# Patient Record
Sex: FEMALE | Race: WHITE | Employment: OTHER | URBAN - NONMETROPOLITAN AREA
[De-identification: names, ages, dates, MRNs, and addresses within clinical notes are randomized per-mention and may not be internally consistent; named-entity substitution may affect disease eponyms.]

---

## 2018-02-09 ENCOUNTER — HOSPITAL ENCOUNTER (OUTPATIENT)
Dept: NEUROLOGY | Age: 83
Discharge: HOME | End: 2018-02-09
Payer: MEDICARE

## 2018-02-09 PROCEDURE — 95910 NRV CNDJ TEST 7-8 STUDIES: CPT | Performed by: PSYCHIATRY & NEUROLOGY

## 2018-02-09 PROCEDURE — 95886 MUSC TEST DONE W/N TEST COMP: CPT

## 2018-02-09 PROCEDURE — 95910 NRV CNDJ TEST 7-8 STUDIES: CPT

## 2018-02-09 PROCEDURE — 95886 MUSC TEST DONE W/N TEST COMP: CPT | Performed by: PSYCHIATRY & NEUROLOGY

## 2018-03-28 ENCOUNTER — HOSPITAL ENCOUNTER (EMERGENCY)
Facility: HOSPITAL | Age: 83
Discharge: HOME | End: 2018-03-28
Attending: EMERGENCY MEDICINE | Admitting: EMERGENCY MEDICINE

## 2018-03-28 ENCOUNTER — APPOINTMENT (OUTPATIENT)
Dept: GENERAL RADIOLOGY | Facility: HOSPITAL | Age: 83
End: 2018-03-28

## 2018-03-28 VITALS
SYSTOLIC BLOOD PRESSURE: 118 MMHG | OXYGEN SATURATION: 96 % | HEIGHT: 67 IN | DIASTOLIC BLOOD PRESSURE: 67 MMHG | TEMPERATURE: 98.6 F | WEIGHT: 135 LBS | BODY MASS INDEX: 21.19 KG/M2 | HEART RATE: 84 BPM | RESPIRATION RATE: 18 BRPM

## 2018-03-28 DIAGNOSIS — I48.0 PAROXYSMAL ATRIAL FIBRILLATION (HCC): Primary | ICD-10-CM

## 2018-03-28 LAB
ALBUMIN SERPL-MCNC: 3.9 G/DL (ref 3.5–5)
ALBUMIN/GLOB SERPL: 1.1 G/DL (ref 1.1–2.5)
ALP SERPL-CCNC: 77 U/L (ref 24–120)
ALT SERPL W P-5'-P-CCNC: 16 U/L (ref 0–54)
ANION GAP SERPL CALCULATED.3IONS-SCNC: 14 MMOL/L (ref 4–13)
AST SERPL-CCNC: 24 U/L (ref 7–45)
BASOPHILS # BLD AUTO: 0.02 10*3/MM3 (ref 0–0.2)
BASOPHILS NFR BLD AUTO: 0.3 % (ref 0–2)
BILIRUB SERPL-MCNC: 0.6 MG/DL (ref 0.1–1)
BUN BLD-MCNC: 18 MG/DL (ref 5–21)
BUN/CREAT SERPL: 23.4 (ref 7–25)
CALCIUM SPEC-SCNC: 9.7 MG/DL (ref 8.4–10.4)
CHLORIDE SERPL-SCNC: 105 MMOL/L (ref 98–110)
CO2 SERPL-SCNC: 23 MMOL/L (ref 24–31)
CREAT BLD-MCNC: 0.77 MG/DL (ref 0.5–1.4)
D DIMER PPP FEU-MCNC: 1.63 MG/L (FEU) (ref 0–0.5)
DEPRECATED RDW RBC AUTO: 39.2 FL (ref 40–54)
EOSINOPHIL # BLD AUTO: 0.07 10*3/MM3 (ref 0–0.7)
EOSINOPHIL NFR BLD AUTO: 0.9 % (ref 0–4)
ERYTHROCYTE [DISTWIDTH] IN BLOOD BY AUTOMATED COUNT: 12.6 % (ref 12–15)
GFR SERPL CREATININE-BSD FRML MDRD: 71 ML/MIN/1.73
GLOBULIN UR ELPH-MCNC: 3.6 GM/DL
GLUCOSE BLD-MCNC: 108 MG/DL (ref 70–100)
HCT VFR BLD AUTO: 37.5 % (ref 37–47)
HGB BLD-MCNC: 12.7 G/DL (ref 12–16)
IMM GRANULOCYTES # BLD: 0.03 10*3/MM3 (ref 0–0.03)
IMM GRANULOCYTES NFR BLD: 0.4 % (ref 0–5)
LYMPHOCYTES # BLD AUTO: 2.39 10*3/MM3 (ref 0.72–4.86)
LYMPHOCYTES NFR BLD AUTO: 30.1 % (ref 15–45)
MAGNESIUM SERPL-MCNC: 2.1 MG/DL (ref 1.4–2.2)
MCH RBC QN AUTO: 29.2 PG (ref 28–32)
MCHC RBC AUTO-ENTMCNC: 33.9 G/DL (ref 33–36)
MCV RBC AUTO: 86.2 FL (ref 82–98)
MONOCYTES # BLD AUTO: 0.69 10*3/MM3 (ref 0.19–1.3)
MONOCYTES NFR BLD AUTO: 8.7 % (ref 4–12)
NEUTROPHILS # BLD AUTO: 4.74 10*3/MM3 (ref 1.87–8.4)
NEUTROPHILS NFR BLD AUTO: 59.6 % (ref 39–78)
NRBC BLD MANUAL-RTO: 0 /100 WBC (ref 0–0)
PLATELET # BLD AUTO: 251 10*3/MM3 (ref 130–400)
PMV BLD AUTO: 10.1 FL (ref 6–12)
POTASSIUM BLD-SCNC: 4.1 MMOL/L (ref 3.5–5.3)
PROT SERPL-MCNC: 7.5 G/DL (ref 6.3–8.7)
RBC # BLD AUTO: 4.35 10*6/MM3 (ref 4.2–5.4)
SODIUM BLD-SCNC: 142 MMOL/L (ref 135–145)
TROPONIN I SERPL-MCNC: 0.03 NG/ML (ref 0–0.03)
TSH SERPL DL<=0.05 MIU/L-ACNC: 1.44 MIU/ML (ref 0.47–4.68)
WBC NRBC COR # BLD: 7.94 10*3/MM3 (ref 4.8–10.8)

## 2018-03-28 PROCEDURE — 71045 X-RAY EXAM CHEST 1 VIEW: CPT

## 2018-03-28 PROCEDURE — 93010 ELECTROCARDIOGRAM REPORT: CPT | Performed by: INTERNAL MEDICINE

## 2018-03-28 PROCEDURE — 85379 FIBRIN DEGRADATION QUANT: CPT | Performed by: EMERGENCY MEDICINE

## 2018-03-28 PROCEDURE — 84484 ASSAY OF TROPONIN QUANT: CPT | Performed by: EMERGENCY MEDICINE

## 2018-03-28 PROCEDURE — 93005 ELECTROCARDIOGRAM TRACING: CPT | Performed by: EMERGENCY MEDICINE

## 2018-03-28 PROCEDURE — 99284 EMERGENCY DEPT VISIT MOD MDM: CPT

## 2018-03-28 PROCEDURE — 83735 ASSAY OF MAGNESIUM: CPT | Performed by: EMERGENCY MEDICINE

## 2018-03-28 PROCEDURE — 36415 COLL VENOUS BLD VENIPUNCTURE: CPT

## 2018-03-28 PROCEDURE — 96374 THER/PROPH/DIAG INJ IV PUSH: CPT

## 2018-03-28 PROCEDURE — 85025 COMPLETE CBC W/AUTO DIFF WBC: CPT | Performed by: EMERGENCY MEDICINE

## 2018-03-28 PROCEDURE — 84443 ASSAY THYROID STIM HORMONE: CPT | Performed by: EMERGENCY MEDICINE

## 2018-03-28 PROCEDURE — 80053 COMPREHEN METABOLIC PANEL: CPT | Performed by: EMERGENCY MEDICINE

## 2018-03-28 RX ORDER — METOPROLOL TARTRATE 5 MG/5ML
5 INJECTION INTRAVENOUS ONCE
Status: COMPLETED | OUTPATIENT
Start: 2018-03-28 | End: 2018-03-28

## 2018-03-28 RX ORDER — SODIUM CHLORIDE 0.9 % (FLUSH) 0.9 %
10 SYRINGE (ML) INJECTION AS NEEDED
Status: DISCONTINUED | OUTPATIENT
Start: 2018-03-28 | End: 2018-03-28 | Stop reason: HOSPADM

## 2018-03-28 RX ORDER — METOPROLOL SUCCINATE 25 MG/1
25 TABLET, EXTENDED RELEASE ORAL DAILY
Qty: 30 TABLET | Refills: 0 | Status: SHIPPED | OUTPATIENT
Start: 2018-03-28 | End: 2018-04-23 | Stop reason: SDUPTHER

## 2018-03-28 RX ADMIN — METOROPROLOL TARTRATE 5 MG: 5 INJECTION, SOLUTION INTRAVENOUS at 18:27

## 2018-03-31 ENCOUNTER — HOSPITAL ENCOUNTER (EMERGENCY)
Facility: HOSPITAL | Age: 83
Discharge: HOME | End: 2018-03-31
Attending: EMERGENCY MEDICINE | Admitting: EMERGENCY MEDICINE

## 2018-03-31 VITALS
HEART RATE: 48 BPM | BODY MASS INDEX: 21.19 KG/M2 | OXYGEN SATURATION: 96 % | RESPIRATION RATE: 16 BRPM | WEIGHT: 135 LBS | HEIGHT: 67 IN | DIASTOLIC BLOOD PRESSURE: 54 MMHG | TEMPERATURE: 98.2 F | SYSTOLIC BLOOD PRESSURE: 158 MMHG

## 2018-03-31 DIAGNOSIS — R07.9 CHEST PAIN, UNSPECIFIED TYPE: Primary | ICD-10-CM

## 2018-03-31 LAB
ALBUMIN SERPL-MCNC: 3.5 G/DL (ref 3.5–5)
ALBUMIN/GLOB SERPL: 1 G/DL (ref 1.1–2.5)
ALP SERPL-CCNC: 65 U/L (ref 24–120)
ALT SERPL W P-5'-P-CCNC: 29 U/L (ref 0–54)
ANION GAP SERPL CALCULATED.3IONS-SCNC: 10 MMOL/L (ref 4–13)
AST SERPL-CCNC: 23 U/L (ref 7–45)
BASOPHILS # BLD AUTO: 0.05 10*3/MM3 (ref 0–0.2)
BASOPHILS NFR BLD AUTO: 0.6 % (ref 0–2)
BILIRUB SERPL-MCNC: 0.3 MG/DL (ref 0.1–1)
BUN BLD-MCNC: 17 MG/DL (ref 5–21)
BUN/CREAT SERPL: 25.4 (ref 7–25)
CALCIUM SPEC-SCNC: 9.1 MG/DL (ref 8.4–10.4)
CHLORIDE SERPL-SCNC: 107 MMOL/L (ref 98–110)
CO2 SERPL-SCNC: 23 MMOL/L (ref 24–31)
CREAT BLD-MCNC: 0.67 MG/DL (ref 0.5–1.4)
DEPRECATED RDW RBC AUTO: 38.6 FL (ref 40–54)
EOSINOPHIL # BLD AUTO: 0.1 10*3/MM3 (ref 0–0.7)
EOSINOPHIL NFR BLD AUTO: 1.1 % (ref 0–4)
ERYTHROCYTE [DISTWIDTH] IN BLOOD BY AUTOMATED COUNT: 12.7 % (ref 12–15)
GFR SERPL CREATININE-BSD FRML MDRD: 83 ML/MIN/1.73
GLOBULIN UR ELPH-MCNC: 3.4 GM/DL
GLUCOSE BLD-MCNC: 104 MG/DL (ref 70–100)
HCT VFR BLD AUTO: 32.6 % (ref 37–47)
HGB BLD-MCNC: 11.1 G/DL (ref 12–16)
HOLD SPECIMEN: NORMAL
HOLD SPECIMEN: NORMAL
IMM GRANULOCYTES # BLD: 0.04 10*3/MM3 (ref 0–0.03)
IMM GRANULOCYTES NFR BLD: 0.5 % (ref 0–5)
LYMPHOCYTES # BLD AUTO: 1.68 10*3/MM3 (ref 0.72–4.86)
LYMPHOCYTES NFR BLD AUTO: 19 % (ref 15–45)
MCH RBC QN AUTO: 28.9 PG (ref 28–32)
MCHC RBC AUTO-ENTMCNC: 34 G/DL (ref 33–36)
MCV RBC AUTO: 84.9 FL (ref 82–98)
MONOCYTES # BLD AUTO: 0.67 10*3/MM3 (ref 0.19–1.3)
MONOCYTES NFR BLD AUTO: 7.6 % (ref 4–12)
NEUTROPHILS # BLD AUTO: 6.28 10*3/MM3 (ref 1.87–8.4)
NEUTROPHILS NFR BLD AUTO: 71.2 % (ref 39–78)
NRBC BLD MANUAL-RTO: 0 /100 WBC (ref 0–0)
PLATELET # BLD AUTO: 248 10*3/MM3 (ref 130–400)
PMV BLD AUTO: 10.1 FL (ref 6–12)
POTASSIUM BLD-SCNC: 4.2 MMOL/L (ref 3.5–5.3)
PROT SERPL-MCNC: 6.9 G/DL (ref 6.3–8.7)
RBC # BLD AUTO: 3.84 10*6/MM3 (ref 4.2–5.4)
SODIUM BLD-SCNC: 140 MMOL/L (ref 135–145)
TROPONIN I SERPL-MCNC: <0.012 NG/ML (ref 0–0.03)
TSH SERPL DL<=0.05 MIU/L-ACNC: 0.89 MIU/ML (ref 0.47–4.68)
WBC NRBC COR # BLD: 8.82 10*3/MM3 (ref 4.8–10.8)
WHOLE BLOOD HOLD SPECIMEN: NORMAL
WHOLE BLOOD HOLD SPECIMEN: NORMAL

## 2018-03-31 PROCEDURE — 85025 COMPLETE CBC W/AUTO DIFF WBC: CPT | Performed by: EMERGENCY MEDICINE

## 2018-03-31 PROCEDURE — 84484 ASSAY OF TROPONIN QUANT: CPT | Performed by: EMERGENCY MEDICINE

## 2018-03-31 PROCEDURE — 84443 ASSAY THYROID STIM HORMONE: CPT | Performed by: EMERGENCY MEDICINE

## 2018-03-31 PROCEDURE — 93010 ELECTROCARDIOGRAM REPORT: CPT | Performed by: INTERNAL MEDICINE

## 2018-03-31 PROCEDURE — 80053 COMPREHEN METABOLIC PANEL: CPT | Performed by: EMERGENCY MEDICINE

## 2018-03-31 PROCEDURE — 93005 ELECTROCARDIOGRAM TRACING: CPT | Performed by: EMERGENCY MEDICINE

## 2018-03-31 PROCEDURE — 93005 ELECTROCARDIOGRAM TRACING: CPT

## 2018-03-31 PROCEDURE — 99284 EMERGENCY DEPT VISIT MOD MDM: CPT

## 2018-03-31 RX ORDER — SODIUM CHLORIDE 0.9 % (FLUSH) 0.9 %
10 SYRINGE (ML) INJECTION AS NEEDED
Status: DISCONTINUED | OUTPATIENT
Start: 2018-03-31 | End: 2018-03-31 | Stop reason: HOSPADM

## 2018-04-04 ENCOUNTER — TELEPHONE (OUTPATIENT)
Dept: CARDIOLOGY | Facility: CLINIC | Age: 83
End: 2018-04-04

## 2018-04-04 ENCOUNTER — APPOINTMENT (OUTPATIENT)
Dept: CT IMAGING | Facility: HOSPITAL | Age: 83
End: 2018-04-04

## 2018-04-04 ENCOUNTER — HOSPITAL ENCOUNTER (EMERGENCY)
Facility: HOSPITAL | Age: 83
Discharge: HOME | End: 2018-04-04
Attending: EMERGENCY MEDICINE | Admitting: EMERGENCY MEDICINE

## 2018-04-04 VITALS
HEIGHT: 66 IN | RESPIRATION RATE: 16 BRPM | OXYGEN SATURATION: 100 % | HEART RATE: 52 BPM | BODY MASS INDEX: 21.69 KG/M2 | TEMPERATURE: 99.1 F | SYSTOLIC BLOOD PRESSURE: 189 MMHG | WEIGHT: 135 LBS | DIASTOLIC BLOOD PRESSURE: 60 MMHG

## 2018-04-04 DIAGNOSIS — I15.9 SECONDARY HYPERTENSION: Primary | ICD-10-CM

## 2018-04-04 LAB
ANION GAP SERPL CALCULATED.3IONS-SCNC: 10 MMOL/L (ref 4–13)
BASOPHILS # BLD AUTO: 0.05 10*3/MM3 (ref 0–0.2)
BASOPHILS NFR BLD AUTO: 0.7 % (ref 0–2)
BUN BLD-MCNC: 19 MG/DL (ref 5–21)
BUN/CREAT SERPL: 23.5 (ref 7–25)
CALCIUM SPEC-SCNC: 9.7 MG/DL (ref 8.4–10.4)
CHLORIDE SERPL-SCNC: 103 MMOL/L (ref 98–110)
CO2 SERPL-SCNC: 27 MMOL/L (ref 24–31)
CREAT BLD-MCNC: 0.81 MG/DL (ref 0.5–1.4)
DEPRECATED RDW RBC AUTO: 39.6 FL (ref 40–54)
EOSINOPHIL # BLD AUTO: 0.13 10*3/MM3 (ref 0–0.7)
EOSINOPHIL NFR BLD AUTO: 1.7 % (ref 0–4)
ERYTHROCYTE [DISTWIDTH] IN BLOOD BY AUTOMATED COUNT: 12.7 % (ref 12–15)
GFR SERPL CREATININE-BSD FRML MDRD: 67 ML/MIN/1.73
GLUCOSE BLD-MCNC: 103 MG/DL (ref 70–100)
HCT VFR BLD AUTO: 36.2 % (ref 37–47)
HGB BLD-MCNC: 12.2 G/DL (ref 12–16)
HOLD SPECIMEN: NORMAL
HOLD SPECIMEN: NORMAL
IMM GRANULOCYTES # BLD: 0.02 10*3/MM3 (ref 0–0.03)
IMM GRANULOCYTES NFR BLD: 0.3 % (ref 0–5)
LYMPHOCYTES # BLD AUTO: 2.53 10*3/MM3 (ref 0.72–4.86)
LYMPHOCYTES NFR BLD AUTO: 33 % (ref 15–45)
MCH RBC QN AUTO: 28.9 PG (ref 28–32)
MCHC RBC AUTO-ENTMCNC: 33.7 G/DL (ref 33–36)
MCV RBC AUTO: 85.8 FL (ref 82–98)
MONOCYTES # BLD AUTO: 0.68 10*3/MM3 (ref 0.19–1.3)
MONOCYTES NFR BLD AUTO: 8.9 % (ref 4–12)
NEUTROPHILS # BLD AUTO: 4.25 10*3/MM3 (ref 1.87–8.4)
NEUTROPHILS NFR BLD AUTO: 55.4 % (ref 39–78)
NRBC BLD MANUAL-RTO: 0 /100 WBC (ref 0–0)
PLATELET # BLD AUTO: 279 10*3/MM3 (ref 130–400)
PMV BLD AUTO: 9.7 FL (ref 6–12)
POTASSIUM BLD-SCNC: 4.4 MMOL/L (ref 3.5–5.3)
RBC # BLD AUTO: 4.22 10*6/MM3 (ref 4.2–5.4)
SODIUM BLD-SCNC: 140 MMOL/L (ref 135–145)
TROPONIN I SERPL-MCNC: <0.012 NG/ML (ref 0–0.03)
WBC NRBC COR # BLD: 7.66 10*3/MM3 (ref 4.8–10.8)
WHOLE BLOOD HOLD SPECIMEN: NORMAL
WHOLE BLOOD HOLD SPECIMEN: NORMAL

## 2018-04-04 PROCEDURE — 84484 ASSAY OF TROPONIN QUANT: CPT | Performed by: EMERGENCY MEDICINE

## 2018-04-04 PROCEDURE — 96374 THER/PROPH/DIAG INJ IV PUSH: CPT

## 2018-04-04 PROCEDURE — 80048 BASIC METABOLIC PNL TOTAL CA: CPT | Performed by: EMERGENCY MEDICINE

## 2018-04-04 PROCEDURE — 85025 COMPLETE CBC W/AUTO DIFF WBC: CPT | Performed by: EMERGENCY MEDICINE

## 2018-04-04 PROCEDURE — 93010 ELECTROCARDIOGRAM REPORT: CPT | Performed by: INTERNAL MEDICINE

## 2018-04-04 PROCEDURE — 70450 CT HEAD/BRAIN W/O DYE: CPT

## 2018-04-04 PROCEDURE — 93005 ELECTROCARDIOGRAM TRACING: CPT | Performed by: EMERGENCY MEDICINE

## 2018-04-04 PROCEDURE — 25010000002 HYDRALAZINE PER 20 MG: Performed by: EMERGENCY MEDICINE

## 2018-04-04 PROCEDURE — 99284 EMERGENCY DEPT VISIT MOD MDM: CPT

## 2018-04-04 RX ORDER — HYDRALAZINE HYDROCHLORIDE 20 MG/ML
10 INJECTION INTRAMUSCULAR; INTRAVENOUS ONCE
Status: COMPLETED | OUTPATIENT
Start: 2018-04-04 | End: 2018-04-04

## 2018-04-04 RX ORDER — HYDROCHLOROTHIAZIDE 12.5 MG/1
12.5 TABLET ORAL DAILY
Qty: 7 TABLET | Refills: 0 | Status: SHIPPED | OUTPATIENT
Start: 2018-04-04 | End: 2018-04-23 | Stop reason: SDUPTHER

## 2018-04-04 RX ADMIN — Medication 10 MG: at 22:02

## 2018-04-04 NOTE — ED NOTES
"ED Call Back Questions    1. How are you doing since leaving the Emergency Department?    Doing very well  2. Do you have any questions about your discharge instructions? No     3. Have you filled your new prescriptions yet? N/A  a. Do you have any questions about those medications? N/A    4. Were you able to make a follow-up appointment with the physician? Yes     5. Do you have a primary care physician? Yes   a. If No, would you like for me to set you up with one? N/A  i. If Yes, “I will have our ED  give you a call right back at this number to work with you on the best time for an appointment.”    6. We are always looking to get better at what we do. Do you have any suggestions for what we can do to be even better? No   a. If Yes, \"Thank you for sharing your concerns. I apologize. I will follow up with our manager and patient . Would you like someone to call you back?\" No     7. Is there anything else I can do for you? No   Visit was vey good     Fab Boykin  04/04/18 1141    "

## 2018-04-04 NOTE — TELEPHONE ENCOUNTER
Pt called left a  msg that she had gone to the ER with a fast HR and was told to call to make appt.  Pt has appt on 4/23 with Dr. Oro which is the earliest we can see her.  She wanted sooner.  I left a vm msg back to her and told her this was the best we can do and to go back to the ER or to her pcp if she feels she needs in sooner or she can call to make appt with a NP.  I left this on her vm.  Herberth Ortega, CMA

## 2018-04-05 NOTE — DISCHARGE INSTRUCTIONS
"    Hypertension  Hypertension, commonly called high blood pressure, is when the force of blood pumping through the arteries is too strong. The arteries are the blood vessels that carry blood from the heart throughout the body. Hypertension forces the heart to work harder to pump blood and may cause arteries to become narrow or stiff. Having untreated or uncontrolled hypertension can cause heart attacks, strokes, kidney disease, and other problems.  A blood pressure reading consists of a higher number over a lower number. Ideally, your blood pressure should be below 120/80. The first (\"top\") number is called the systolic pressure. It is a measure of the pressure in your arteries as your heart beats. The second (\"bottom\") number is called the diastolic pressure. It is a measure of the pressure in your arteries as the heart relaxes.  What are the causes?  The cause of this condition is not known.  What increases the risk?  Some risk factors for high blood pressure are under your control. Others are not.  Factors you can change   · Smoking.  · Having type 2 diabetes mellitus, high cholesterol, or both.  · Not getting enough exercise or physical activity.  · Being overweight.  · Having too much fat, sugar, calories, or salt (sodium) in your diet.  · Drinking too much alcohol.  Factors that are difficult or impossible to change   · Having chronic kidney disease.  · Having a family history of high blood pressure.  · Age. Risk increases with age.  · Race. You may be at higher risk if you are -American.  · Gender. Men are at higher risk than women before age 45. After age 65, women are at higher risk than men.  · Having obstructive sleep apnea.  · Stress.  What are the signs or symptoms?  Extremely high blood pressure (hypertensive crisis) may cause:  · Headache.  · Anxiety.  · Shortness of breath.  · Nosebleed.  · Nausea and vomiting.  · Severe chest pain.  · Jerky movements you cannot control (seizures).  How is " this diagnosed?  This condition is diagnosed by measuring your blood pressure while you are seated, with your arm resting on a surface. The cuff of the blood pressure monitor will be placed directly against the skin of your upper arm at the level of your heart. It should be measured at least twice using the same arm. Certain conditions can cause a difference in blood pressure between your right and left arms.  Certain factors can cause blood pressure readings to be lower or higher than normal (elevated) for a short period of time:  · When your blood pressure is higher when you are in a health care provider's office than when you are at home, this is called white coat hypertension. Most people with this condition do not need medicines.  · When your blood pressure is higher at home than when you are in a health care provider's office, this is called masked hypertension. Most people with this condition may need medicines to control blood pressure.  If you have a high blood pressure reading during one visit or you have normal blood pressure with other risk factors:  · You may be asked to return on a different day to have your blood pressure checked again.  · You may be asked to monitor your blood pressure at home for 1 week or longer.  If you are diagnosed with hypertension, you may have other blood or imaging tests to help your health care provider understand your overall risk for other conditions.  How is this treated?  This condition is treated by making healthy lifestyle changes, such as eating healthy foods, exercising more, and reducing your alcohol intake. Your health care provider may prescribe medicine if lifestyle changes are not enough to get your blood pressure under control, and if:  · Your systolic blood pressure is above 130.  · Your diastolic blood pressure is above 80.  Your personal target blood pressure may vary depending on your medical conditions, your age, and other factors.  Follow these  instructions at home:  Eating and drinking   · Eat a diet that is high in fiber and potassium, and low in sodium, added sugar, and fat. An example eating plan is called the DASH (Dietary Approaches to Stop Hypertension) diet. To eat this way:  ¨ Eat plenty of fresh fruits and vegetables. Try to fill half of your plate at each meal with fruits and vegetables.  ¨ Eat whole grains, such as whole wheat pasta, brown rice, or whole grain bread. Fill about one quarter of your plate with whole grains.  ¨ Eat or drink low-fat dairy products, such as skim milk or low-fat yogurt.  ¨ Avoid fatty cuts of meat, processed or cured meats, and poultry with skin. Fill about one quarter of your plate with lean proteins, such as fish, chicken without skin, beans, eggs, and tofu.  ¨ Avoid premade and processed foods. These tend to be higher in sodium, added sugar, and fat.  · Reduce your daily sodium intake. Most people with hypertension should eat less than 1,500 mg of sodium a day.  · Limit alcohol intake to no more than 1 drink a day for nonpregnant women and 2 drinks a day for men. One drink equals 12 oz of beer, 5 oz of wine, or 1½ oz of hard liquor.  Lifestyle   · Work with your health care provider to maintain a healthy body weight or to lose weight. Ask what an ideal weight is for you.  · Get at least 30 minutes of exercise that causes your heart to beat faster (aerobic exercise) most days of the week. Activities may include walking, swimming, or biking.  · Include exercise to strengthen your muscles (resistance exercise), such as pilates or lifting weights, as part of your weekly exercise routine. Try to do these types of exercises for 30 minutes at least 3 days a week.  · Do not use any products that contain nicotine or tobacco, such as cigarettes and e-cigarettes. If you need help quitting, ask your health care provider.  · Monitor your blood pressure at home as told by your health care provider.  · Keep all follow-up visits  as told by your health care provider. This is important.  Medicines   · Take over-the-counter and prescription medicines only as told by your health care provider. Follow directions carefully. Blood pressure medicines must be taken as prescribed.  · Do not skip doses of blood pressure medicine. Doing this puts you at risk for problems and can make the medicine less effective.  · Ask your health care provider about side effects or reactions to medicines that you should watch for.  Contact a health care provider if:  · You think you are having a reaction to a medicine you are taking.  · You have headaches that keep coming back (recurring).  · You feel dizzy.  · You have swelling in your ankles.  · You have trouble with your vision.  Get help right away if:  · You develop a severe headache or confusion.  · You have unusual weakness or numbness.  · You feel faint.  · You have severe pain in your chest or abdomen.  · You vomit repeatedly.  · You have trouble breathing.  Summary  · Hypertension is when the force of blood pumping through your arteries is too strong. If this condition is not controlled, it may put you at risk for serious complications.  · Your personal target blood pressure may vary depending on your medical conditions, your age, and other factors. For most people, a normal blood pressure is less than 120/80.  · Hypertension is treated with lifestyle changes, medicines, or a combination of both. Lifestyle changes include weight loss, eating a healthy, low-sodium diet, exercising more, and limiting alcohol.  This information is not intended to replace advice given to you by your health care provider. Make sure you discuss any questions you have with your health care provider.  Document Released: 12/18/2006 Document Revised: 11/15/2017 Document Reviewed: 11/15/2017  [employer] Interactive Patient Education © 2017 [employer]

## 2018-04-05 NOTE — ED PROVIDER NOTES
Subjective   Patient is a 87-year-old female who presents from urgent care with hypertension.  Patient does take metoprolol for rapid heart rate but otherwise no other medication.  She states she has a long-standing history of hypertension but has not taken any other medication.  Today, patient was very anxious and went to [facility] to have her blood pressure checked.  They noted it to be 230 systolic.  Patient was directed here.  Patient denies any signs of end organ damage.  Denies any headache, vision changes, focal numbness or weakness, confusion, slurred speech, chest pain, abdominal pain, urinary symptoms.  She otherwise feels normal.            Review of Systems   Constitutional: Negative for chills, diaphoresis, fatigue and fever.   HENT: Negative for sore throat.    Eyes: Negative for visual disturbance.   Respiratory: Negative for shortness of breath.    Cardiovascular: Negative for chest pain, palpitations and leg swelling.   Gastrointestinal: Negative for abdominal pain, constipation, diarrhea, nausea and vomiting.   Genitourinary: Negative for difficulty urinating, dysuria and hematuria.   Musculoskeletal: Negative for back pain.   Skin: Negative for rash.   Neurological: Negative for dizziness, tremors, seizures, syncope, speech difficulty, weakness, light-headedness, numbness and headaches.       Past Medical History:   Diagnosis Date   • Hypertension    • Pain of left heel        No Known Allergies    Past Surgical History:   Procedure Laterality Date   • CHOLECYSTECTOMY         History reviewed. No pertinent family history.    Social History     Social History   • Marital status:      Social History Main Topics   • Smoking status: Never Smoker   • Smokeless tobacco: Never Used   • Alcohol use No   • Drug use: No     Other Topics Concern   • Not on file       Lab Results (last 24 hours)     Procedure Component Value Units Date/Time    CBC & Differential [[id number]] Collected:  04/04/18 2007     Specimen:  Blood Updated:  04/04/18 2018    Narrative:       The following orders were created for panel order CBC & Differential.  Procedure                               Abnormality         Status                     ---------                               -----------         ------                     CBC Auto Differential[[id number]]        Abnormal            Final result                 Please view results for these tests on the individual orders.    Basic Metabolic Panel [[id number]]  (Abnormal) Collected:  04/04/18 2007    Specimen:  Blood Updated:  04/04/18 2026     Glucose 103 (H) mg/dL      BUN 19 mg/dL      Creatinine 0.81 mg/dL      Sodium 140 mmol/L      Potassium 4.4 mmol/L      Chloride 103 mmol/L      CO2 27.0 mmol/L      Calcium 9.7 mg/dL      eGFR Non African Amer 67 mL/min/1.73      BUN/Creatinine Ratio 23.5     Anion Gap 10.0 mmol/L     Narrative:       The MDRD GFR formula is only valid for adults with stable renal function between ages 18 and 70.    Troponin [[id number]]  (Normal) Collected:  04/04/18 2007    Specimen:  Blood Updated:  04/04/18 2039     Troponin I <0.012 ng/mL     CBC Auto Differential [[id number]]  (Abnormal) Collected:  04/04/18 2007    Specimen:  Blood Updated:  04/04/18 2018     WBC 7.66 10*3/mm3      RBC 4.22 10*6/mm3      Hemoglobin 12.2 g/dL      Hematocrit 36.2 (L) %      MCV 85.8 fL      MCH 28.9 pg      MCHC 33.7 g/dL      RDW 12.7 %      RDW-SD 39.6 (L) fl      MPV 9.7 fL      Platelets 279 10*3/mm3      Neutrophil % 55.4 %      Lymphocyte % 33.0 %      Monocyte % 8.9 %      Eosinophil % 1.7 %      Basophil % 0.7 %      Immature Grans % 0.3 %      Neutrophils, Absolute 4.25 10*3/mm3      Lymphocytes, Absolute 2.53 10*3/mm3      Monocytes, Absolute 0.68 10*3/mm3      Eosinophils, Absolute 0.13 10*3/mm3      Basophils, Absolute 0.05 10*3/mm3      Immature Grans, Absolute 0.02 10*3/mm3      nRBC 0.0 /100 WBC           Objective   Physical Exam   Constitutional: She is  oriented to person, place, and time. She appears well-nourished.  Non-toxic appearance. She does not have a sickly appearance. She does not appear ill. No distress.   Looks younger than stated age.  /71 heart rate 60   HENT:   Head: Atraumatic.   Mouth/Throat: Oropharynx is clear and moist.   Eyes: Conjunctivae and EOM are normal. Pupils are equal, round, and reactive to light.   Fundoscopic exam:       The right eye shows no papilledema.        The left eye shows no papilledema.   Neck: Normal range of motion. Neck supple.   Cardiovascular: Normal rate, regular rhythm, normal heart sounds and intact distal pulses.  Exam reveals no gallop and no friction rub.    No murmur heard.  Pulmonary/Chest: Effort normal and breath sounds normal. No respiratory distress. She has no wheezes. She has no rales. She exhibits no tenderness.   Abdominal: Soft. She exhibits no distension and no mass. There is no tenderness. There is no rebound, no guarding, no CVA tenderness and negative Montanez's sign.   Musculoskeletal: Normal range of motion. She exhibits no edema.   Neurological: She is alert and oriented to person, place, and time. She has normal strength. No cranial nerve deficit or sensory deficit. GCS eye subscore is 4. GCS verbal subscore is 5. GCS motor subscore is 6.   Skin: Skin is warm and dry. She is not diaphoretic.   Psychiatric: She has a normal mood and affect.   Nursing note and vitals reviewed.      ECG 12 Lead    Date/Time: 4/4/2018 9:02 PM  Performed by: MELANIA TOURE  Authorized by: MELANIA TOURE   Interpreted by physician  Comparison: not compared with previous ECG   Rhythm: sinus bradycardia  Rate: bradycardic  QRS axis: left  Conduction: conduction normal  ST Segments: ST segments normal  T depression: III  Other: no other findings  Clinical impression: non-specific ECG  Comments: Rate 47, GA interval 160, QRS 76, QTc 419               CT Head Without Contrast   Final Result   Moderate  "cerebral and cerebellar volume loss with chronic microvascular   disease but no evidence of acute intracranial process.           This report was finalized on 04/04/2018 21:10 by Dr. Barry Gaffney MD.          BP (!) 186/67   Pulse (!) 49   Temp 98 °F (36.7 °C) (Temporal Artery )   Resp 16   Ht 167.6 cm (66\")   Wt 61.2 kg (135 lb)   SpO2 100%   BMI 21.79 kg/m²     ED Course    ED Course   Comment By Time   According to chart, patient was here recently and was in atrial fibrillation.  Placed on metoprolol.  Patient did not want to stay in the hospital at that time. Gabriel Santacruz MD 04/04 2022   This is an 87-year-old female who presents with concerns for hypertension.  Asymptomatic otherwise.  Examination unremarkable.  No signs of end organ damage.  She has had some episodes of bradycardia but is on metoprolol.  ECG was sinus bradycardia.  There were identifiable P waves.  Her lab work is benign.  Creatinine normal.  Troponin negative.  Initial /71.  Repeat 1 hour later was 186/67 with no intervention.  aGbriel Santacruz MD 04/04 2106   Head CT was negative. Gabriel Santacruz MD 04/04 2123   Given long-standing history of hypertension , I would not recommend treating at this point. Current blood pressure 189 over 60s.  No signs of end organ damage.  We will discharge patient home.  I did advise PCP follow-up this week and patient expressed understanding.  I also discussed return precautions. Gabriel Santacruz MD 04/04 2127   Repeat pressure 220s upon discharge.  We will give 10 mg IV hydralazine and discharged home on hydrochlorothiazide.  I will provide one week and patient can follow-up this week. Gabriel Santacruz MD 04/04 2156   She does have slight bradycardia but her rate has been anywhere from 47 up to 60s.  She is on metoprolol.  I would not change this at this point.  She can follow-up this week.  She is otherwise asymptomatic.    Medications - No data to display     "     MDM  Number of Diagnoses or Management Options  Secondary hypertension: minor     Amount and/or Complexity of Data Reviewed  Clinical lab tests: ordered and reviewed  Tests in the radiology section of CPT®: ordered and reviewed    Risk of Complications, Morbidity, and/or Mortality  Presenting problems: minimal  Diagnostic procedures: minimal  Management options: minimal    Patient Progress  Patient progress: stable      Final diagnoses:   Secondary hypertension          Gabriel Santacruz MD  04/04/18 2129       Gabriel Santacruz MD  04/04/18 2153

## 2018-04-12 ENCOUNTER — APPOINTMENT (OUTPATIENT)
Dept: GENERAL RADIOLOGY | Facility: HOSPITAL | Age: 83
End: 2018-04-12

## 2018-04-12 ENCOUNTER — HOSPITAL ENCOUNTER (EMERGENCY)
Facility: HOSPITAL | Age: 83
Discharge: HOME | End: 2018-04-12
Admitting: EMERGENCY MEDICINE

## 2018-04-12 VITALS
BODY MASS INDEX: 21.53 KG/M2 | DIASTOLIC BLOOD PRESSURE: 51 MMHG | SYSTOLIC BLOOD PRESSURE: 147 MMHG | OXYGEN SATURATION: 100 % | WEIGHT: 134 LBS | HEIGHT: 66 IN | HEART RATE: 76 BPM | RESPIRATION RATE: 16 BRPM | TEMPERATURE: 97.8 F

## 2018-04-12 DIAGNOSIS — I10 ESSENTIAL HYPERTENSION: Primary | ICD-10-CM

## 2018-04-12 LAB
ALBUMIN SERPL-MCNC: 3.9 G/DL (ref 3.5–5)
ALBUMIN/GLOB SERPL: 1.1 G/DL (ref 1.1–2.5)
ALP SERPL-CCNC: 68 U/L (ref 24–120)
ALT SERPL W P-5'-P-CCNC: 20 U/L (ref 0–54)
ANION GAP SERPL CALCULATED.3IONS-SCNC: 8 MMOL/L (ref 4–13)
APTT PPP: 33.4 SECONDS (ref 24.1–34.8)
AST SERPL-CCNC: 29 U/L (ref 7–45)
BASOPHILS # BLD AUTO: 0.03 10*3/MM3 (ref 0–0.2)
BASOPHILS NFR BLD AUTO: 0.5 % (ref 0–2)
BILIRUB SERPL-MCNC: 0.6 MG/DL (ref 0.1–1)
BILIRUB UR QL STRIP: NEGATIVE
BUN BLD-MCNC: 23 MG/DL (ref 5–21)
BUN/CREAT SERPL: 31.1 (ref 7–25)
CALCIUM SPEC-SCNC: 9.6 MG/DL (ref 8.4–10.4)
CHLORIDE SERPL-SCNC: 102 MMOL/L (ref 98–110)
CLARITY UR: CLEAR
CO2 SERPL-SCNC: 28 MMOL/L (ref 24–31)
COLOR UR: YELLOW
CREAT BLD-MCNC: 0.74 MG/DL (ref 0.5–1.4)
DEPRECATED RDW RBC AUTO: 38.8 FL (ref 40–54)
EOSINOPHIL # BLD AUTO: 0.18 10*3/MM3 (ref 0–0.7)
EOSINOPHIL NFR BLD AUTO: 3.2 % (ref 0–4)
ERYTHROCYTE [DISTWIDTH] IN BLOOD BY AUTOMATED COUNT: 12.5 % (ref 12–15)
GFR SERPL CREATININE-BSD FRML MDRD: 74 ML/MIN/1.73
GLOBULIN UR ELPH-MCNC: 3.7 GM/DL
GLUCOSE BLD-MCNC: 104 MG/DL (ref 70–100)
GLUCOSE UR STRIP-MCNC: NEGATIVE MG/DL
HCT VFR BLD AUTO: 33.6 % (ref 37–47)
HGB BLD-MCNC: 11.6 G/DL (ref 12–16)
HGB UR QL STRIP.AUTO: NEGATIVE
HOLD SPECIMEN: NORMAL
HOLD SPECIMEN: NORMAL
IMM GRANULOCYTES # BLD: 0.01 10*3/MM3 (ref 0–0.03)
IMM GRANULOCYTES NFR BLD: 0.2 % (ref 0–5)
INR PPP: 1.06 (ref 0.91–1.09)
KETONES UR QL STRIP: NEGATIVE
LEUKOCYTE ESTERASE UR QL STRIP.AUTO: NEGATIVE
LYMPHOCYTES # BLD AUTO: 2.13 10*3/MM3 (ref 0.72–4.86)
LYMPHOCYTES NFR BLD AUTO: 37.7 % (ref 15–45)
MCH RBC QN AUTO: 29.4 PG (ref 28–32)
MCHC RBC AUTO-ENTMCNC: 34.5 G/DL (ref 33–36)
MCV RBC AUTO: 85.3 FL (ref 82–98)
MONOCYTES # BLD AUTO: 0.48 10*3/MM3 (ref 0.19–1.3)
MONOCYTES NFR BLD AUTO: 8.5 % (ref 4–12)
NEUTROPHILS # BLD AUTO: 2.82 10*3/MM3 (ref 1.87–8.4)
NEUTROPHILS NFR BLD AUTO: 49.9 % (ref 39–78)
NITRITE UR QL STRIP: NEGATIVE
NRBC BLD MANUAL-RTO: 0 /100 WBC (ref 0–0)
PH UR STRIP.AUTO: 8.5 [PH] (ref 5–8)
PLATELET # BLD AUTO: 274 10*3/MM3 (ref 130–400)
PMV BLD AUTO: 9.5 FL (ref 6–12)
POTASSIUM BLD-SCNC: 4 MMOL/L (ref 3.5–5.3)
PROT SERPL-MCNC: 7.6 G/DL (ref 6.3–8.7)
PROT UR QL STRIP: NEGATIVE
PROTHROMBIN TIME: 14.1 SECONDS (ref 11.9–14.6)
RBC # BLD AUTO: 3.94 10*6/MM3 (ref 4.2–5.4)
SODIUM BLD-SCNC: 138 MMOL/L (ref 135–145)
SP GR UR STRIP: 1.01 (ref 1–1.03)
TROPONIN I SERPL-MCNC: 0.01 NG/ML (ref 0–0.03)
UROBILINOGEN UR QL STRIP: ABNORMAL
WBC NRBC COR # BLD: 5.65 10*3/MM3 (ref 4.8–10.8)
WHOLE BLOOD HOLD SPECIMEN: NORMAL
WHOLE BLOOD HOLD SPECIMEN: NORMAL

## 2018-04-12 PROCEDURE — 99284 EMERGENCY DEPT VISIT MOD MDM: CPT

## 2018-04-12 PROCEDURE — 81003 URINALYSIS AUTO W/O SCOPE: CPT | Performed by: NURSE PRACTITIONER

## 2018-04-12 PROCEDURE — 93005 ELECTROCARDIOGRAM TRACING: CPT | Performed by: NURSE PRACTITIONER

## 2018-04-12 PROCEDURE — 85610 PROTHROMBIN TIME: CPT | Performed by: NURSE PRACTITIONER

## 2018-04-12 PROCEDURE — 80053 COMPREHEN METABOLIC PANEL: CPT | Performed by: NURSE PRACTITIONER

## 2018-04-12 PROCEDURE — 84484 ASSAY OF TROPONIN QUANT: CPT | Performed by: NURSE PRACTITIONER

## 2018-04-12 PROCEDURE — 93010 ELECTROCARDIOGRAM REPORT: CPT | Performed by: INTERNAL MEDICINE

## 2018-04-12 PROCEDURE — 85730 THROMBOPLASTIN TIME PARTIAL: CPT | Performed by: NURSE PRACTITIONER

## 2018-04-12 PROCEDURE — 71045 X-RAY EXAM CHEST 1 VIEW: CPT

## 2018-04-12 PROCEDURE — 87040 BLOOD CULTURE FOR BACTERIA: CPT | Performed by: NURSE PRACTITIONER

## 2018-04-12 PROCEDURE — 96374 THER/PROPH/DIAG INJ IV PUSH: CPT

## 2018-04-12 PROCEDURE — 85025 COMPLETE CBC W/AUTO DIFF WBC: CPT | Performed by: NURSE PRACTITIONER

## 2018-04-12 PROCEDURE — 25010000002 HYDRALAZINE PER 20 MG: Performed by: NURSE PRACTITIONER

## 2018-04-12 RX ORDER — SODIUM CHLORIDE 0.9 % (FLUSH) 0.9 %
10 SYRINGE (ML) INJECTION AS NEEDED
Status: DISCONTINUED | OUTPATIENT
Start: 2018-04-12 | End: 2018-04-12 | Stop reason: HOSPADM

## 2018-04-12 RX ORDER — HYDRALAZINE HYDROCHLORIDE 20 MG/ML
10 INJECTION INTRAMUSCULAR; INTRAVENOUS ONCE
Status: COMPLETED | OUTPATIENT
Start: 2018-04-12 | End: 2018-04-12

## 2018-04-12 RX ORDER — LISINOPRIL 10 MG/1
10 TABLET ORAL DAILY
Qty: 30 TABLET | Refills: 0 | Status: SHIPPED | OUTPATIENT
Start: 2018-04-12 | End: 2018-04-23 | Stop reason: SDUPTHER

## 2018-04-12 RX ADMIN — Medication 10 MG: at 13:39

## 2018-04-12 NOTE — ED PROVIDER NOTES
"Subjective   Patient is an 87-year-old white female presents with complaints of \"just not feeling well and low blood pressure is high.\"  Patient states she has been having problems with her blood pressure for the last several months.  She was seen here in emergency department on April 4 and previous to that on March 31.  She had been prescribed metoprolol has been taking that as directed.  She states she does have a appointment with Dr. Oro on April 23 for her blood pressure and her history of A. fib.  Patient states that she is status post op day 2 of right carpal tunnel surgery per Dr. Berkowitz in [place], [place].  She denies chest pain or shortness of breath.            Review of Systems   Constitutional: Negative.    HENT: Negative.    Eyes: Negative.    Respiratory: Negative.    Cardiovascular: Negative.         Patient is an 87-year-old white female presents with complaints of \"just not feeling well and low blood pressure is high.\"  Patient states she has been having problems with her blood pressure for the last several months.  She was seen here in emergency department on April 4 and previous to that on March 31.  She had been prescribed metoprolol has been taking that as directed.  She states she does have a appointment with Dr. Oro on April 23 for her blood pressure and her history of A. fib.  Patient states that she is status post op day 2 of right carpal tunnel surgery per Dr. Berkowitz in [place]  She denies chest pain or shortness of breath.     Gastrointestinal: Negative.    Endocrine: Negative.    Genitourinary: Negative.    Musculoskeletal: Negative.    Skin: Negative.    Allergic/Immunologic: Negative.    Neurological: Negative.    Hematological: Negative.    Psychiatric/Behavioral: Negative.    All other systems reviewed and are negative.      Past Medical History:   Diagnosis Date   • Hypertension    • Pain of left heel        No Known Allergies    Past Surgical History:   Procedure Laterality " "Date   • CHOLECYSTECTOMY         History reviewed. No pertinent family history.    Social History     Social History   • Marital status:      Social History Main Topics   • Smoking status: Never Smoker   • Smokeless tobacco: Never Used   • Alcohol use No   • Drug use: No     Other Topics Concern   • Not on file       Prior to Admission medications    Medication Sig Start Date End Date Taking? Authorizing Provider   hydrochlorothiazide (HYDRODIURIL) 12.5 MG tablet Take 1 tablet by mouth Daily. 4/4/18   Gabriel Santacruz MD   metoprolol succinate XL (TOPROL-XL) 25 MG 24 hr tablet Take 1 tablet by mouth Daily. 3/28/18   Byron Deutsch Jr., MD       /51   Pulse 76   Temp 97.8 °F (36.6 °C) (Oral)   Resp 16   Ht 167.6 cm (66\")   Wt 60.8 kg (134 lb)   SpO2 100%   BMI 21.63 kg/m²     Objective   Physical Exam   Constitutional: She is oriented to person, place, and time. She appears well-developed and well-nourished.   HENT:   Head: Normocephalic and atraumatic.   Eyes: Conjunctivae and EOM are normal. Pupils are equal, round, and reactive to light.   Neck: Normal range of motion. Neck supple. No tracheal deviation present. No thyromegaly present.   Cardiovascular: Normal rate, regular rhythm, normal heart sounds and intact distal pulses.    Pulmonary/Chest: Effort normal and breath sounds normal. No respiratory distress. She has no wheezes. She has no rales. She exhibits no tenderness.   Abdominal: Soft. Bowel sounds are normal.   Musculoskeletal: Normal range of motion.   Right upper extremity- surgical incision intact. No signs of infection noted. periph pulses palp.    Neurological: She is alert and oriented to person, place, and time. She has normal reflexes. No cranial nerve deficit.   Skin: Skin is warm and dry.   Psychiatric: She has a normal mood and affect. Her behavior is normal. Judgment and thought content normal.   Nursing note and vitals reviewed.      Procedures         Lab Results " (last 24 hours)     Procedure Component Value Units Date/Time    CBC & Differential [[id number]] Collected:  04/12/18 1338    Specimen:  Blood Updated:  04/12/18 1350    Narrative:       The following orders were created for panel order CBC & Differential.  Procedure                               Abnormality         Status                     ---------                               -----------         ------                     CBC Auto Differential[[id number]]        Abnormal            Final result                 Please view results for these tests on the individual orders.    Comprehensive Metabolic Panel [[id number]]  (Abnormal) Collected:  04/12/18 1338    Specimen:  Blood Updated:  04/12/18 1403     Glucose 104 (H) mg/dL      BUN 23 (H) mg/dL      Creatinine 0.74 mg/dL      Sodium 138 mmol/L      Potassium 4.0 mmol/L      Chloride 102 mmol/L      CO2 28.0 mmol/L      Calcium 9.6 mg/dL      Total Protein 7.6 g/dL      Albumin 3.90 g/dL      ALT (SGPT) 20 U/L      AST (SGOT) 29 U/L      Alkaline Phosphatase 68 U/L      Total Bilirubin 0.6 mg/dL      eGFR Non African Amer 74 mL/min/1.73      Globulin 3.7 gm/dL      A/G Ratio 1.1 g/dL      BUN/Creatinine Ratio 31.1 (H)     Anion Gap 8.0 mmol/L     Narrative:       The MDRD GFR formula is only valid for adults with stable renal function between ages 18 and 70.    Protime-INR [[id number]]  (Normal) Collected:  04/12/18 1338    Specimen:  Blood Updated:  04/12/18 1358     Protime 14.1 Seconds      INR 1.06    aPTT [[id number]]  (Normal) Collected:  04/12/18 1338    Specimen:  Blood Updated:  04/12/18 1358     PTT 33.4 seconds     Troponin [[id number]]  (Normal) Collected:  04/12/18 1338    Specimen:  Blood Updated:  04/12/18 1414     Troponin I 0.015 ng/mL     Blood Culture - Blood, Blood, Venous Line [[id number]] Collected:  04/12/18 1338    Specimen:  Blood from Arm, Left Updated:  04/12/18 1349    Blood Culture - Blood, Blood, Venous Line [[id number]] Collected:   04/12/18 1338    Specimen:  Blood from Arm, Left Updated:  04/12/18 1349    CBC Auto Differential [[id number]]  (Abnormal) Collected:  04/12/18 1338    Specimen:  Blood Updated:  04/12/18 1350     WBC 5.65 10*3/mm3      RBC 3.94 (L) 10*6/mm3      Hemoglobin 11.6 (L) g/dL      Hematocrit 33.6 (L) %      MCV 85.3 fL      MCH 29.4 pg      MCHC 34.5 g/dL      RDW 12.5 %      RDW-SD 38.8 (L) fl      MPV 9.5 fL      Platelets 274 10*3/mm3      Neutrophil % 49.9 %      Lymphocyte % 37.7 %      Monocyte % 8.5 %      Eosinophil % 3.2 %      Basophil % 0.5 %      Immature Grans % 0.2 %      Neutrophils, Absolute 2.82 10*3/mm3      Lymphocytes, Absolute 2.13 10*3/mm3      Monocytes, Absolute 0.48 10*3/mm3      Eosinophils, Absolute 0.18 10*3/mm3      Basophils, Absolute 0.03 10*3/mm3      Immature Grans, Absolute 0.01 10*3/mm3      nRBC 0.0 /100 WBC     Urinalysis With / Culture If Indicated - Urine, Clean Catch [[id number]]  (Abnormal) Collected:  04/12/18 1459    Specimen:  Urine from Urine, Clean Catch Updated:  04/12/18 1509     Color, UA Yellow     Appearance, UA Clear     pH, UA 8.5 (H)     Specific Gravity, UA 1.007     Glucose, UA Negative     Ketones, UA Negative     Bilirubin, UA Negative     Blood, UA Negative     Protein, UA Negative     Leuk Esterase, UA Negative     Nitrite, UA Negative     Urobilinogen, UA 0.2 E.U./dL    Narrative:       Urine microscopic not indicated.          XR Chest 1 View   ED Interpretation   1. No acute cardiopulmonary process.           2. Moderate-sized hiatal hernia.           This report was finalized on 04/12/2018 14:20 by Dr. Maninder May MD.      Final Result   1. No acute cardiopulmonary process.           2. Moderate-sized hiatal hernia.           This report was finalized on 04/12/2018 14:20 by Dr. Maninder May MD.          ED Course  ED Course   Comment By Time   Reviewed pt and pt care plan with dr ybarra- also assessed pt and in agreement with pt care plan. Dr ybarra  advised to add zestril to medication regimen and have follow up with pcp and dr ford as sched. Will be discharged shortly in stable condition - advised to return if complications  Misty Arce, JOSÉ MIGUEL 04/12 1507          MDM  Number of Diagnoses or Management Options  Essential hypertension: minor     Amount and/or Complexity of Data Reviewed  Clinical lab tests: ordered and reviewed  Tests in the radiology section of CPT®: ordered and reviewed  Independent visualization of images, tracings, or specimens: yes    Patient Progress  Patient progress: stable      Final diagnoses:   Essential hypertension          JOSÉ MIGUEL Sierra  04/12/18 5406

## 2018-04-12 NOTE — DISCHARGE INSTRUCTIONS
Return to ER if symptoms worsen       Hypertension  Hypertension is another name for high blood pressure. High blood pressure forces your heart to work harder to pump blood. This can cause problems over time.  There are two numbers in a blood pressure reading. There is a top number (systolic) over a bottom number (diastolic). It is best to have a blood pressure below 120/80. Healthy choices can help lower your blood pressure. You may need medicine to help lower your blood pressure if:  · Your blood pressure cannot be lowered with healthy choices.  · Your blood pressure is higher than 130/80.  Follow these instructions at home:  Eating and drinking   · If directed, follow the DASH eating plan. This diet includes:  ¨ Filling half of your plate at each meal with fruits and vegetables.  ¨ Filling one quarter of your plate at each meal with whole grains. Whole grains include whole wheat pasta, brown rice, and whole grain bread.  ¨ Eating or drinking low-fat dairy products, such as skim milk or low-fat yogurt.  ¨ Filling one quarter of your plate at each meal with low-fat (lean) proteins. Low-fat proteins include fish, skinless chicken, eggs, beans, and tofu.  ¨ Avoiding fatty meat, cured and processed meat, or chicken with skin.  ¨ Avoiding premade or processed food.  · Eat less than 1,500 mg of salt (sodium) a day.  · Limit alcohol use to no more than 1 drink a day for nonpregnant women and 2 drinks a day for men. One drink equals 12 oz of beer, 5 oz of wine, or 1½ oz of hard liquor.  Lifestyle   · Work with your doctor to stay at a healthy weight or to lose weight. Ask your doctor what the best weight is for you.  · Get at least 30 minutes of exercise that causes your heart to beat faster (aerobic exercise) most days of the week. This may include walking, swimming, or biking.  · Get at least 30 minutes of exercise that strengthens your muscles (resistance exercise) at least 3 days a week. This may include lifting  weights or pilates.  · Do not use any products that contain nicotine or tobacco. This includes cigarettes and e-cigarettes. If you need help quitting, ask your doctor.  · Check your blood pressure at home as told by your doctor.  · Keep all follow-up visits as told by your doctor. This is important.  Medicines   · Take over-the-counter and prescription medicines only as told by your doctor. Follow directions carefully.  · Do not skip doses of blood pressure medicine. The medicine does not work as well if you skip doses. Skipping doses also puts you at risk for problems.  · Ask your doctor about side effects or reactions to medicines that you should watch for.  Contact a doctor if:  · You think you are having a reaction to the medicine you are taking.  · You have headaches that keep coming back (recurring).  · You feel dizzy.  · You have swelling in your ankles.  · You have trouble with your vision.  Get help right away if:  · You get a very bad headache.  · You start to feel confused.  · You feel weak or numb.  · You feel faint.  · You get very bad pain in your:  ¨ Chest.  ¨ Belly (abdomen).  · You throw up (vomit) more than once.  · You have trouble breathing.  Summary  · Hypertension is another name for high blood pressure.  · Making healthy choices can help lower blood pressure. If your blood pressure cannot be controlled with healthy choices, you may need to take medicine.  This information is not intended to replace advice given to you by your health care provider. Make sure you discuss any questions you have with your health care provider.  Document Released: 06/05/2009 Document Revised: 11/15/2017 Document Reviewed: 11/15/2017  [employer] Patient Education © 2017 [employer]

## 2018-04-17 LAB
BACTERIA SPEC AEROBE CULT: NORMAL
BACTERIA SPEC AEROBE CULT: NORMAL

## 2018-04-23 ENCOUNTER — OFFICE VISIT (OUTPATIENT)
Dept: CARDIOLOGY | Facility: CLINIC | Age: 83
End: 2018-04-23

## 2018-04-23 ENCOUNTER — HOSPITAL ENCOUNTER (OUTPATIENT)
Dept: CARDIOLOGY | Facility: HOSPITAL | Age: 83
Discharge: HOME | End: 2018-04-23
Attending: INTERNAL MEDICINE | Admitting: INTERNAL MEDICINE

## 2018-04-23 VITALS
WEIGHT: 134 LBS | OXYGEN SATURATION: 98 % | HEART RATE: 53 BPM | DIASTOLIC BLOOD PRESSURE: 64 MMHG | HEIGHT: 66 IN | SYSTOLIC BLOOD PRESSURE: 130 MMHG | BODY MASS INDEX: 21.53 KG/M2

## 2018-04-23 DIAGNOSIS — R00.2 PALPITATIONS: ICD-10-CM

## 2018-04-23 DIAGNOSIS — R00.2 PALPITATIONS: Primary | ICD-10-CM

## 2018-04-23 DIAGNOSIS — I10 ESSENTIAL HYPERTENSION: ICD-10-CM

## 2018-04-23 PROCEDURE — 93306 TTE W/DOPPLER COMPLETE: CPT | Performed by: INTERNAL MEDICINE

## 2018-04-23 PROCEDURE — 93306 TTE W/DOPPLER COMPLETE: CPT

## 2018-04-23 PROCEDURE — 99204 OFFICE O/P NEW MOD 45 MIN: CPT | Performed by: INTERNAL MEDICINE

## 2018-04-23 RX ORDER — ASPIRIN 81 MG/1
81 TABLET ORAL DAILY
COMMUNITY

## 2018-04-23 NOTE — PROGRESS NOTES
Referring Provider: JOSÉ MIGUEL Byrd    Reason for Consultation: SOB and palpitations    Chief complaint:   Chief Complaint   Patient presents with   • New Pateint     referred by [facility] ER for chest pressure, dizziness and fatigue.   • Chest Pain     pressure.  was just in the ER last week.   • Dizziness     slightly   • Fatigue     slightly   • Shortness of Breath     the first time she went to the ER her heart was beating at 150 bpm and she was very sob.       Subjective .     History of present illness:  Jeannette Diamond is a 87 y.o. yo female with history of HTN who presents today for evaluation of SOB and palpitations that occurred 2 weeks ago but resolved before she could get to the ER. She denies any similar episodes.  Chief Complaint   Patient presents with   • New Pateint     referred by [facility] ER for chest pressure, dizziness and fatigue.   • Chest Pain     pressure.  was just in the ER last week.   • Dizziness     slightly   • Fatigue     slightly   • Shortness of Breath     the first time she went to the ER her heart was beating at 150 bpm and she was very sob.   .    History  Past Medical History:   Diagnosis Date   • Hypertension    • Pain of left heel    ,   Past Surgical History:   Procedure Laterality Date   • CHOLECYSTECTOMY     ,   Family History   Problem Relation Age of Onset   • No Known Problems Mother    • Stroke Father    • Ulcers Father    • No Known Problems Sister    • No Known Problems Brother    • No Known Problems Brother    • No Known Problems Brother    • No Known Problems Brother    ,   Social History   Substance Use Topics   • Smoking status: Never Smoker   • Smokeless tobacco: Never Used   • Alcohol use No   ,     Medications  Current Outpatient Prescriptions   Medication Sig Dispense Refill   • aspirin 81 MG EC tablet Take 81 mg by mouth Daily.     • hydrochlorothiazide (HYDRODIURIL) 12.5 MG tablet Take 1 tablet by mouth Daily. 7 tablet 0   • lisinopril (PRINIVIL,ZESTRIL) 10  "MG tablet Take 1 tablet by mouth Daily. 30 tablet 0   • metoprolol succinate XL (TOPROL-XL) 25 MG 24 hr tablet Take 1 tablet by mouth Daily. 30 tablet 0     No current facility-administered medications for this visit.         Allergies:  Review of patient's allergies indicates no known allergies.    Review of Systems  Review of Systems   HENT: Negative for nosebleeds.    Cardiovascular: Negative for chest pain, claudication, dyspnea on exertion, irregular heartbeat, leg swelling, near-syncope, orthopnea, palpitations, paroxysmal nocturnal dyspnea and syncope.   Respiratory: Negative for cough, hemoptysis and shortness of breath.    Gastrointestinal: Negative for dysphagia, hematemesis and melena.   Genitourinary: Negative for hematuria.   All other systems reviewed and are negative.      Objective     Physical Exam:  /64 (BP Location: Left arm, Patient Position: Sitting, Cuff Size: Adult)   Pulse 53   Ht 167.6 cm (66\")   Wt 60.8 kg (134 lb)   SpO2 98%   BMI 21.63 kg/m²   Physical Exam   Constitutional: She is oriented to person, place, and time. She appears well-developed and well-nourished. No distress.   HENT:   Head: Normocephalic and atraumatic.   Eyes: No scleral icterus.   Neck: Normal range of motion.   Cardiovascular: Normal rate, regular rhythm and normal heart sounds.  Exam reveals no gallop and no friction rub.    No murmur heard.  Pulmonary/Chest: Effort normal and breath sounds normal. No respiratory distress. She has no wheezes. She has no rales.   Abdominal: Soft. Bowel sounds are normal. She exhibits no distension. There is no tenderness.   Musculoskeletal: She exhibits no edema.   Neurological: She is alert and oriented to person, place, and time. No cranial nerve deficit.   Skin: Skin is warm and dry. She is not diaphoretic. No erythema.   Psychiatric: She has a normal mood and affect. Her behavior is normal.       Results Review:   I reviewed the patient's new clinical " results.  Procedures    Admission on 04/12/2018, Discharged on 04/12/2018   Component Date Value Ref Range Status   • Glucose 04/12/2018 104* 70 - 100 mg/dL Final   • BUN 04/12/2018 23* 5 - 21 mg/dL Final   • Creatinine 04/12/2018 0.74  0.50 - 1.40 mg/dL Final   • Sodium 04/12/2018 138  135 - 145 mmol/L Final   • Potassium 04/12/2018 4.0  3.5 - 5.3 mmol/L Final   • Chloride 04/12/2018 102  98 - 110 mmol/L Final   • CO2 04/12/2018 28.0  24.0 - 31.0 mmol/L Final   • Calcium 04/12/2018 9.6  8.4 - 10.4 mg/dL Final   • Total Protein 04/12/2018 7.6  6.3 - 8.7 g/dL Final   • Albumin 04/12/2018 3.90  3.50 - 5.00 g/dL Final   • ALT (SGPT) 04/12/2018 20  0 - 54 U/L Final   • AST (SGOT) 04/12/2018 29  7 - 45 U/L Final   • Alkaline Phosphatase 04/12/2018 68  24 - 120 U/L Final   • Total Bilirubin 04/12/2018 0.6  0.1 - 1.0 mg/dL Final   • eGFR Non African Amer 04/12/2018 74  >60 mL/min/1.73 Final   • Globulin 04/12/2018 3.7  gm/dL Final   • A/G Ratio 04/12/2018 1.1  1.1 - 2.5 g/dL Final   • BUN/Creatinine Ratio 04/12/2018 31.1* 7.0 - 25.0 Final   • Anion Gap 04/12/2018 8.0  4.0 - 13.0 mmol/L Final   • Protime 04/12/2018 14.1  11.9 - 14.6 Seconds Final   • INR 04/12/2018 1.06  0.91 - 1.09 Final   • PTT 04/12/2018 33.4  24.1 - 34.8 seconds Final   • Color, UA 04/12/2018 Yellow  Yellow, Straw Final   • Appearance, UA 04/12/2018 Clear  Clear Final   • pH, UA 04/12/2018 8.5* 5.0 - 8.0 Final   • Specific Gravity, UA 04/12/2018 1.007  1.005 - 1.030 Final   • Glucose, UA 04/12/2018 Negative  Negative Final   • Ketones, UA 04/12/2018 Negative  Negative Final   • Bilirubin, UA 04/12/2018 Negative  Negative Final   • Blood, UA 04/12/2018 Negative  Negative Final   • Protein, UA 04/12/2018 Negative  Negative Final   • Leuk Esterase, UA 04/12/2018 Negative  Negative Final   • Nitrite, UA 04/12/2018 Negative  Negative Final   • Urobilinogen, UA 04/12/2018 0.2 E.U./dL  0.2 - 1.0 E.U./dL Final   • Troponin I 04/12/2018 0.015  0.000 - 0.034  ng/mL Final   • Blood Culture 04/17/2018 No growth at 5 days   Final   • Blood Culture 04/17/2018 No growth at 5 days   Final   • WBC 04/12/2018 5.65  4.80 - 10.80 10*3/mm3 Final   • RBC 04/12/2018 3.94* 4.20 - 5.40 10*6/mm3 Final   • Hemoglobin 04/12/2018 11.6* 12.0 - 16.0 g/dL Final   • Hematocrit 04/12/2018 33.6* 37.0 - 47.0 % Final   • MCV 04/12/2018 85.3  82.0 - 98.0 fL Final   • MCH 04/12/2018 29.4  28.0 - 32.0 pg Final   • MCHC 04/12/2018 34.5  33.0 - 36.0 g/dL Final   • RDW 04/12/2018 12.5  12.0 - 15.0 % Final   • RDW-SD 04/12/2018 38.8* 40.0 - 54.0 fl Final   • MPV 04/12/2018 9.5  6.0 - 12.0 fL Final   • Platelets 04/12/2018 274  130 - 400 10*3/mm3 Final   • Neutrophil % 04/12/2018 49.9  39.0 - 78.0 % Final   • Lymphocyte % 04/12/2018 37.7  15.0 - 45.0 % Final   • Monocyte % 04/12/2018 8.5  4.0 - 12.0 % Final   • Eosinophil % 04/12/2018 3.2  0.0 - 4.0 % Final   • Basophil % 04/12/2018 0.5  0.0 - 2.0 % Final   • Immature Grans % 04/12/2018 0.2  0.0 - 5.0 % Final   • Neutrophils, Absolute 04/12/2018 2.82  1.87 - 8.40 10*3/mm3 Final   • Lymphocytes, Absolute 04/12/2018 2.13  0.72 - 4.86 10*3/mm3 Final   • Monocytes, Absolute 04/12/2018 0.48  0.19 - 1.30 10*3/mm3 Final   • Eosinophils, Absolute 04/12/2018 0.18  0.00 - 0.70 10*3/mm3 Final   • Basophils, Absolute 04/12/2018 0.03  0.00 - 0.20 10*3/mm3 Final   • Immature Grans, Absolute 04/12/2018 0.01  0.00 - 0.03 10*3/mm3 Final   • nRBC 04/12/2018 0.0  0.0 - 0.0 /100 WBC Final   • Extra Tube 04/12/2018 hold for add-on   Final   • Extra Tube 04/12/2018 Hold for add-ons.   Final   • Extra Tube 04/12/2018 hold for add-on   Final   • Extra Tube 04/12/2018 Hold for add-ons.   Final       Assessment/Plan   Jeannette was seen today for new pateint, chest pain, dizziness, fatigue and shortness of breath.    Diagnoses and all orders for this visit:    Palpitations, will get 21 day event monitor and an echo to assess LV function    Essential hypertension, good  control

## 2018-04-24 LAB
BH CV ECHO MEAS - AI DEC SLOPE: 143 CM/SEC^2
BH CV ECHO MEAS - AI MAX PG: 76.5 MMHG
BH CV ECHO MEAS - AI MAX VEL: 433 CM/SEC
BH CV ECHO MEAS - AI P1/2T: 886.9 MSEC
BH CV ECHO MEAS - AO MAX PG (FULL): 5.2 MMHG
BH CV ECHO MEAS - AO MAX PG: 12.1 MMHG
BH CV ECHO MEAS - AO MEAN PG (FULL): 4 MMHG
BH CV ECHO MEAS - AO MEAN PG: 7 MMHG
BH CV ECHO MEAS - AO ROOT AREA (BSA CORRECTED): 2
BH CV ECHO MEAS - AO ROOT AREA: 8.6 CM^2
BH CV ECHO MEAS - AO ROOT DIAM: 3.3 CM
BH CV ECHO MEAS - AO V2 MAX: 174 CM/SEC
BH CV ECHO MEAS - AO V2 MEAN: 129 CM/SEC
BH CV ECHO MEAS - AO V2 VTI: 47.1 CM
BH CV ECHO MEAS - AVA(I,A): 1.9 CM^2
BH CV ECHO MEAS - AVA(I,D): 1.9 CM^2
BH CV ECHO MEAS - AVA(V,A): 2.1 CM^2
BH CV ECHO MEAS - AVA(V,D): 2.1 CM^2
BH CV ECHO MEAS - BSA(HAYCOCK): 1.7 M^2
BH CV ECHO MEAS - BSA: 1.7 M^2
BH CV ECHO MEAS - BZI_BMI: 21.6 KILOGRAMS/M^2
BH CV ECHO MEAS - BZI_METRIC_HEIGHT: 167.6 CM
BH CV ECHO MEAS - BZI_METRIC_WEIGHT: 60.8 KG
BH CV ECHO MEAS - CONTRAST EF 4CH: 60.8 ML/M^2
BH CV ECHO MEAS - EDV(CUBED): 107.9 ML
BH CV ECHO MEAS - EDV(MOD-SP4): 84.7 ML
BH CV ECHO MEAS - EDV(TEICH): 105.4 ML
BH CV ECHO MEAS - EF(CUBED): 74.5 %
BH CV ECHO MEAS - EF(MOD-SP4): 60.8 %
BH CV ECHO MEAS - EF(TEICH): 66.3 %
BH CV ECHO MEAS - ESV(CUBED): 27.5 ML
BH CV ECHO MEAS - ESV(MOD-SP4): 33.2 ML
BH CV ECHO MEAS - ESV(TEICH): 35.6 ML
BH CV ECHO MEAS - FS: 36.6 %
BH CV ECHO MEAS - IVS/LVPW: 0.99
BH CV ECHO MEAS - IVSD: 1 CM
BH CV ECHO MEAS - LA DIMENSION: 3.3 CM
BH CV ECHO MEAS - LA/AO: 1
BH CV ECHO MEAS - LAT PEAK E' VEL: 7.2 CM/SEC
BH CV ECHO MEAS - LV DIASTOLIC VOL/BSA (35-75): 50.2 ML/M^2
BH CV ECHO MEAS - LV MASS(C)D: 176 GRAMS
BH CV ECHO MEAS - LV MASS(C)DI: 104.3 GRAMS/M^2
BH CV ECHO MEAS - LV MAX PG: 6.9 MMHG
BH CV ECHO MEAS - LV MEAN PG: 3 MMHG
BH CV ECHO MEAS - LV SYSTOLIC VOL/BSA (12-30): 19.7 ML/M^2
BH CV ECHO MEAS - LV V1 MAX: 131 CM/SEC
BH CV ECHO MEAS - LV V1 MEAN: 74.6 CM/SEC
BH CV ECHO MEAS - LV V1 VTI: 31 CM
BH CV ECHO MEAS - LVIDD: 4.8 CM
BH CV ECHO MEAS - LVIDS: 3 CM
BH CV ECHO MEAS - LVLD AP4: 7.9 CM
BH CV ECHO MEAS - LVLS AP4: 6.4 CM
BH CV ECHO MEAS - LVOT AREA (M): 2.8 CM^2
BH CV ECHO MEAS - LVOT AREA: 2.8 CM^2
BH CV ECHO MEAS - LVOT DIAM: 1.9 CM
BH CV ECHO MEAS - LVPWD: 1 CM
BH CV ECHO MEAS - MV A MAX VEL: 94.1 CM/SEC
BH CV ECHO MEAS - MV DEC TIME: 0.32 SEC
BH CV ECHO MEAS - MV E MAX VEL: 83.4 CM/SEC
BH CV ECHO MEAS - MV E/A: 0.89
BH CV ECHO MEAS - RAP SYSTOLE: 5 MMHG
BH CV ECHO MEAS - RVSP: 34.6 MMHG
BH CV ECHO MEAS - SI(AO): 238.8 ML/M^2
BH CV ECHO MEAS - SI(CUBED): 47.6 ML/M^2
BH CV ECHO MEAS - SI(LVOT): 52.1 ML/M^2
BH CV ECHO MEAS - SI(MOD-SP4): 30.5 ML/M^2
BH CV ECHO MEAS - SI(TEICH): 41.4 ML/M^2
BH CV ECHO MEAS - SV(AO): 402.8 ML
BH CV ECHO MEAS - SV(CUBED): 80.3 ML
BH CV ECHO MEAS - SV(LVOT): 87.9 ML
BH CV ECHO MEAS - SV(MOD-SP4): 51.5 ML
BH CV ECHO MEAS - SV(TEICH): 69.9 ML
BH CV ECHO MEAS - TR MAX VEL: 272 CM/SEC
LEFT ATRIUM VOLUME INDEX: 43 ML/M2
LV EF 2D ECHO EST: 65 %
MAXIMAL PREDICTED HEART RATE: 133 BPM
STRESS TARGET HR: 113 BPM

## 2018-04-24 RX ORDER — METOPROLOL SUCCINATE 25 MG/1
25 TABLET, EXTENDED RELEASE ORAL DAILY
Qty: 30 TABLET | Refills: 11 | Status: SHIPPED | OUTPATIENT
Start: 2018-04-24 | End: 2021-09-13 | Stop reason: SDUPTHER

## 2018-04-24 RX ORDER — HYDROCHLOROTHIAZIDE 12.5 MG/1
12.5 TABLET ORAL DAILY
Qty: 30 TABLET | Refills: 11 | Status: SHIPPED | OUTPATIENT
Start: 2018-04-24 | End: 2021-09-13 | Stop reason: SINTOL

## 2018-04-24 RX ORDER — LISINOPRIL 10 MG/1
10 TABLET ORAL DAILY
Qty: 30 TABLET | Refills: 11 | Status: SHIPPED | OUTPATIENT
Start: 2018-04-24 | End: 2019-04-09 | Stop reason: SDUPTHER

## 2018-04-27 ENCOUNTER — OFFICE VISIT (OUTPATIENT)
Dept: PODIATRY | Facility: CLINIC | Age: 83
End: 2018-04-27

## 2018-04-27 VITALS
HEIGHT: 66 IN | HEART RATE: 56 BPM | BODY MASS INDEX: 21.53 KG/M2 | WEIGHT: 134 LBS | SYSTOLIC BLOOD PRESSURE: 118 MMHG | OXYGEN SATURATION: 97 % | DIASTOLIC BLOOD PRESSURE: 60 MMHG

## 2018-04-27 DIAGNOSIS — M79.671 FOOT PAIN, RIGHT: ICD-10-CM

## 2018-04-27 DIAGNOSIS — M20.42 HAMMER TOES OF BOTH FEET: ICD-10-CM

## 2018-04-27 DIAGNOSIS — L84 FOOT CALLUS: Primary | ICD-10-CM

## 2018-04-27 DIAGNOSIS — M20.41 HAMMER TOES OF BOTH FEET: ICD-10-CM

## 2018-04-27 PROCEDURE — 99203 OFFICE O/P NEW LOW 30 MIN: CPT | Performed by: PODIATRIST

## 2018-04-27 PROCEDURE — 11056 PARNG/CUTG B9 HYPRKR LES 2-4: CPT | Performed by: PODIATRIST

## 2018-04-27 NOTE — PROGRESS NOTES
"    [facility] - PODIATRY    Today's Date: 04/27/18    Patient Name: Jeannette Diamond  MRN: [id number]  CSN: [id number]  PCP: JOSÉ MIGUEL Byrd  Referring Provider: No ref. provider found    SUBJECTIVE     Chief Complaint   Patient presents with   • Right Foot - Establish Care     PT is here to establish care. PT c/o corn on 4th right toe, pain in right foot. Pain scale: 9/10 when \"flaring up.\" PCP: Niyah Dangelo last visit 01/04/2018     HPI: Jeannette Diamond, a 87 y.o.female, comes to clinic as a(n) new patient complaining of painful lesions on toes. Patient has h/o HTN. Patient is non-diabetic. Patient states that she has painful lesions to multiple toes. States that they are most painful when wearing shoes (especially heels) and walking. Denies open wounds or sores to feet. Denies injury or trauma. Notes the lesions have come and gone over the past several years. Denies drainage or redness. Admits pain at 9/10 level and described as aching and sharp. Relates previous treatment(s) including paring by podiatrist and wearing corn pad. Denies any constitutional symptoms. No other pedal complaints at this time.    Past Medical History:   Diagnosis Date   • Hypertension    • Pain of left heel      Past Surgical History:   Procedure Laterality Date   • CHOLECYSTECTOMY       Family History   Problem Relation Age of Onset   • No Known Problems Mother    • Stroke Father    • Ulcers Father    • No Known Problems Sister    • No Known Problems Brother    • No Known Problems Brother    • No Known Problems Brother    • No Known Problems Brother      Social History     Social History   • Marital status:      Spouse name: N/A   • Number of children: N/A   • Years of education: N/A     Occupational History   • Not on file.     Social History Main Topics   • Smoking status: Never Smoker   • Smokeless tobacco: Never Used   • Alcohol use No   • Drug use: No   • Sexual activity: Defer     Other Topics Concern   • Not " on file     Social History Narrative   • No narrative on file     No Known Allergies  Current Outpatient Prescriptions   Medication Sig Dispense Refill   • aspirin 81 MG EC tablet Take 81 mg by mouth Daily.     • hydrochlorothiazide (HYDRODIURIL) 12.5 MG tablet Take 1 tablet by mouth Daily. 30 tablet 11   • lisinopril (PRINIVIL,ZESTRIL) 10 MG tablet Take 1 tablet by mouth Daily. 30 tablet 11   • metoprolol succinate XL (TOPROL-XL) 25 MG 24 hr tablet Take 1 tablet by mouth Daily. 30 tablet 11     No current facility-administered medications for this visit.      Review of Systems   Constitutional: Negative for chills and fever.   HENT: Negative for congestion.    Respiratory: Negative for shortness of breath.    Cardiovascular: Negative for chest pain and leg swelling.   Gastrointestinal: Negative for constipation, diarrhea, nausea and vomiting.   Musculoskeletal:        Foot pain   Skin: Negative for wound.   Neurological: Negative for numbness.       OBJECTIVE     Vitals:    04/27/18 1419   BP: 118/60   Pulse: 56   SpO2: 97%       PHYSICAL EXAM  GEN:   A&Ox3, NAD. Pt presents to clinic ambulating without assistance and wearing Casual Shoes.      NEURO:   Protective sensation intact to 10/10 sites Right foot, 10/10 site Left foot using [place] monofilament  Light touch sensation present  No Tinel's or Villeux sign.    VASC:  Skin temperature Warm to Warm proximal to distal shane  DP pulses 2/4 Right, 2/4 Left  PT pulses 2/4 Right, 2/4 Left  CFT 3 sec shane  Pedal hair growth absent  no edema noted shane    MUSC/SKEL:  Muscle Strength Right foot Dorsiflexors 5/5, Plantarflexors 5/5, Evertors 5/5, Invertors 5/5  Muscle Strength Left foot Dorsiflexors 5/5, Plantarflexors 5/5, Evertors 5/5, Invertors 5/5  ROM of the 1st MTP is full without pain or crepitus  ROM of the MTJ is full without pain or crepitus    ROM of the STJ is full without pain or crepitus    ROM of the ankle joint is full without pain or crepitus     POP of toe lesions at right 4th toe and 2nd toe  Rectus foot type   Gait pattern: Normal  Semi-rigid contracture of digits 2-5 shane    DERM:  Pedal nails x10 are within normal limits of length and thickness  Webspaces are Clean, Dry, and Intact  Skin is normal in turgor and texture with no open wounds or sores appreciated.  Nucleated HPK noted to dorsal DIPJ of right 4th and 2nd toes.       RADIOLOGY/NUCLEAR:  Ct Head Without Contrast    Result Date: 4/4/2018  Narrative: CT BRAIN without contrast 4/4/2018 8:27 PM CDT  HISTORY: Hypertension  COMPARISON: None  DLP: 730 mGy cm. Automated exposure control was utilized to diminish patient radiation dose.  TECHNIQUE: Serial axial tomographic images of the brain were obtained without the use of intravenous contrast.  FINDINGS: The midline structures are nondisplaced. There is moderate cerebral and cerebellar volume loss, with an associated increase in the prominence of the ventricles and sulci. The basilar cisterns are normal in size and configuration. There is no evidence of intracranial hemorrhage or mass-effect. There is low attenuation in the periventricular white matter, consistent with chronic ischemic change. There are no abnormal extra-axial fluid collections. There is no evidence of tonsillar herniation.  The included orbits and their contents are unremarkable. The visualized paranasal sinuses, mastoid air cells and middle ear cavities are clear. The visualized osseous structures and overlying soft tissues of the skull and face are intact.      Impression: Moderate cerebral and cerebellar volume loss with chronic microvascular disease but no evidence of acute intracranial process.   This report was finalized on 04/04/2018 21:10 by Dr. Barry Gaffney MD.    Xr Chest 1 View    Result Date: 4/12/2018  Narrative: EXAMINATION:   XR CHEST 1 VW-  4/12/2018 2:20 PM CDT  HISTORY: Hypertension fever  Frontal upright radiograph of the chest 4/12/2018 2:07 PM CDT   COMPARISON: March 28, 2018.  FINDINGS: Lungs are clear. Moderate to large hiatal hernia is present.. The cardiomediastinal silhouette and pulmonary vascularity are within normal limits.  The osseous structures and surrounding soft tissues demonstrate no acute abnormality.      Impression: 1. No acute cardiopulmonary process.   2. Moderate-sized hiatal hernia.   This report was finalized on 04/12/2018 14:20 by Dr. Maninder May MD.    Xr Chest 1 View    Result Date: 3/28/2018  Narrative: XR CHEST 1 VW- 3/28/2018 6:13 PM CDT  HISTORY: SOB   COMPARISON: None.  FINDINGS: Emphysematous changes are seen in both lungs. There is a moderate sized hiatal hernia. The main pulmonary artery shadow is enlarged, possibly representing pulmonary arterial hypertension.  The osseous structures and surrounding soft tissues demonstrate no acute abnormality.      Impression: 1. No evidence of acute cardiopulmonary process. 2. Moderate-sized hiatal hernia. 3. Questionable pulmonary arterial hypertension.   This report was finalized on 03/28/2018 18:16 by Dr. Hugh Merritt MD.      LABORATORY/CULTURE RESULTS:      PATHOLOGY RESULTS:       ASSESSMENT/PLAN     Jeannette was seen today for establish care.    Diagnoses and all orders for this visit:    Foot callus    Foot pain, right    Hammer toes of both feet      Comprehensive lower extremity examination and evaluation was performed.  Discussed findings and treatment plan including risks, benefits, and treatment options with patient in detail. Patient agreed with treatment plan.  After verbal consent obtained, calluses x2 pared utilizing dermal curette and/or scalpel without incidence  Patient may maintain nails and calluses at home utilizing emery board or pumice stone between visits as needed   Continue use of corn pads as needed.  Treatment will remain conservative   An After Visit Summary was printed and given to the patient at discharge, including (if requested) any available  informative/educational handouts regarding diagnosis, treatment, or medications. All questions were answered to patient/family satisfaction. Should symptoms fail to improve or worsen they agree to call or return to clinic or to go to the Emergency Department. Discussed the importance of following up with any needed screening tests/labs/specialist appointments and any requested follow-up recommended by me today. Importance of maintaining follow-up discussed and patient accepts that missed appointments can delay diagnosis and potentially lead to worsening of conditions.  Return if symptoms worsen or fail to improve., or sooner if acute issues arise.        This document has been electronically signed by Jose Antunez DPM on April 27, 2018 2:50 PM

## 2018-04-27 NOTE — PATIENT INSTRUCTIONS
Hammer Toe  Hammer toe is a change in the shape (a deformity) of your second, third, or fourth toe. The deformity causes the middle joint of your toe to stay bent. This causes pain, especially when you are wearing shoes. Hammer toe starts gradually. At first, the toe can be straightened. Gradually over time, the deformity becomes stiff and permanent.  Early treatments to keep the toe straight may relieve pain. As the deformity becomes stiff and permanent, surgery may be needed to straighten the toe.  What are the causes?  Hammer toe is caused by abnormal bending of the toe joint that is closest to your foot. It happens gradually over time. This pulls on the muscles and connections (tendons) of the toe joint, making them weak and stiff. It is often related to wearing shoes that are too short or narrow and do not let your toes straighten.  What increases the risk?  You may be at greater risk for hammer toe if you:  · Are female.  · Are older.  · Wear shoes that are too small.  · Wear high-heeled shoes that pinch your toes.  · Are a ballet dancer.  · Have a second toe that is longer than your big toe (first toe).  · Injure your foot or toe.  · Have arthritis.  · Have a family history of hammer toe.  · Have a nerve or muscle disorder.  What are the signs or symptoms?  The main symptoms of this condition are pain and deformity of the toe. The pain is worse when wearing shoes, walking, or running. Other symptoms may include:  · Corns or calluses over the bent part of the toe or between the toes.  · Redness and a burning feeling on the toe.  · An open sore that forms on the top of the toe.  · Not being able to straighten the toe.  How is this diagnosed?  This condition is diagnosed based on your symptoms and a physical exam. During the exam, your health care provider will try to straighten your toe to see how stiff the deformity is. You may also have tests, such as:  · A blood test to check for rheumatoid arthritis.  · An  X-ray to show how severe the deformity is.  How is this treated?  Treatment for this condition will depend on how stiff the deformity is. Surgery is often needed. However, sometimes a hammer toe can be straightened without surgery. Treatments that do not involve surgery include:  · Taping the toe into a straightened position.  · Using pads and cushions to protect the toe (orthotics).  · Wearing shoes that provide enough room for the toes.  · Doing toe-stretching exercises at home.  · Taking an NSAID to reduce pain and swelling.  If these treatments do not help or the toe cannot be straightened, surgery is the next option. The most common surgeries used to straighten a hammer toe include:  · Arthroplasty. In this procedure, part of the joint is removed, and that allows the toe to straighten.  · Fusion. In this procedure, cartilage between the two bones of the joint is taken out and the bones are fused together into one longer bone.  · Implantation. In this procedure, part of the bone is removed and replaced with an implant to let the toe move again.  · Flexor tendon transfer. In this procedure, the tendons that curl the toes down (flexor tendons) are repositioned.  Follow these instructions at home:  · Take over-the-counter and prescription medicines only as told by your health care provider.  · Do toe straightening and stretching exercises as told by your health care provider.  · Keep all follow-up visits as told by your health care provider. This is important.  How is this prevented?  · Wear shoes that give your toes enough room and do not cause pain.  · Do not wear high-heeled shoes.  Contact a health care provider if:  · Your pain gets worse.  · Your toe becomes red or swollen.  · You develop an open sore on your toe.  This information is not intended to replace advice given to you by your health care provider. Make sure you discuss any questions you have with your health care provider.  Document Released:  12/15/2001 Document Revised: 07/07/2017 Document Reviewed: 04/12/2017  [employer] Interactive Patient Education © 2017 [employer]    Corns and Calluses  Corns are small areas of thickened skin that occur on the top, sides, or tip of a toe. They contain a cone-shaped core with a point that can press on a nerve below. This causes pain. Calluses are areas of thickened skin that can occur anywhere on the body including hands, fingers, palms, soles of the feet, and heels. Calluses are usually larger than corns.  What are the causes?  Corns and calluses are caused by rubbing (friction) or pressure, such as from shoes that are too tight or do not fit properly.  What increases the risk?  Corns are more likely to develop in people who have toe deformities, such as hammer toes.  Since calluses can occur with friction to any area of the skin, calluses are more likely to develop in people who:  · Work with their hands.  · Wear shoes that fit poorly, shoes that are too tight, or shoes that are high-heeled.  · Have toes deformities.  What are the signs or symptoms?  Symptoms of a corn or callus include:  · A hard growth on the skin.  · Pain or tenderness under the skin.  · Redness and swelling.  · Increased discomfort while wearing tight-fitting shoes.  How is this diagnosed?  Corns and calluses may be diagnosed with a medical history and physical exam.  How is this treated?  Corns and calluses may be treated with:  · Removing the cause of the friction or pressure. This may include:  ¨ Changing your shoes.  ¨ Wearing shoe inserts (orthotics) or other protective layers in your shoes, such as a corn pad.  ¨ Wearing gloves.  · Medicines to help soften skin in the hardened, thickened areas.  · Reducing the size of the corn or callus by removing the dead layers of skin.  · Antibiotic medicines to treat infection.  · Surgery, if a toe deformity is the cause.  Follow these instructions at home:  · Take medicines only as directed by  your health care provider.  · If you were prescribed an antibiotic, finish all of it even if you start to feel better.  · Wear shoes that fit well. Avoid wearing high-heeled shoes and shoes that are too tight or too loose.  · Wear any padding, protective layers, gloves, or orthotics as directed by your health care provider.  · Soak your hands or feet and then use a file or pumice stone to soften your corn or callus. Do this as directed by your health care provider.  · Check your corn or callus every day for signs of infection. Watch for:  ¨ Redness, swelling, or pain.  ¨ Fluid, blood, or pus.  Contact a health care provider if:  · Your symptoms do not improve with treatment.  · You have increased redness, swelling, or pain at the site of your corn or callus.  · You have fluid, blood, or pus coming from your corn or callus.  · You have new symptoms.  This information is not intended to replace advice given to you by your health care provider. Make sure you discuss any questions you have with your health care provider.  Document Released: 09/23/2005 Document Revised: 07/07/2017 Document Reviewed: 12/14/2015  [employer] Interactive Patient Education © 2017 [employer]

## 2018-05-04 ENCOUNTER — TELEPHONE (OUTPATIENT)
Dept: CARDIOLOGY | Facility: CLINIC | Age: 83
End: 2018-05-04

## 2018-05-04 NOTE — TELEPHONE ENCOUNTER
----- Message from Stephane Oro MD sent at 4/25/2018 10:23 AM CDT -----  EF is normal    Pt informed.  Mailed letter.  Herberth Ortega, CMA

## 2018-05-23 ENCOUNTER — TELEPHONE (OUTPATIENT)
Dept: CARDIOLOGY | Facility: CLINIC | Age: 83
End: 2018-05-23

## 2018-05-23 NOTE — TELEPHONE ENCOUNTER
----- Message from Stephane Oro MD sent at 5/17/2018 10:20 PM CDT -----  No significant arrhythmia identified      Pt informed.  Mailed letter.  Herberth Ortega, CMA

## 2018-08-27 ENCOUNTER — OFFICE VISIT (OUTPATIENT)
Dept: CARDIOLOGY | Facility: CLINIC | Age: 83
End: 2018-08-27

## 2018-08-27 VITALS
WEIGHT: 135 LBS | SYSTOLIC BLOOD PRESSURE: 148 MMHG | BODY MASS INDEX: 21.69 KG/M2 | OXYGEN SATURATION: 93 % | DIASTOLIC BLOOD PRESSURE: 60 MMHG | HEIGHT: 66 IN | HEART RATE: 71 BPM

## 2018-08-27 DIAGNOSIS — R00.2 PALPITATIONS: Primary | ICD-10-CM

## 2018-08-27 DIAGNOSIS — I10 ESSENTIAL HYPERTENSION: ICD-10-CM

## 2018-08-27 PROCEDURE — 99214 OFFICE O/P EST MOD 30 MIN: CPT | Performed by: INTERNAL MEDICINE

## 2018-08-27 PROCEDURE — 93000 ELECTROCARDIOGRAM COMPLETE: CPT | Performed by: INTERNAL MEDICINE

## 2018-08-27 NOTE — PROGRESS NOTES
Referring Provider: Niyah Dangelo APRN    Reason for Follow-up Visit: palpitations    Subjective .   Chief Complaint:   Chief Complaint   Patient presents with   • Follow-up     3 month fu   • lv dysfunction     follwing up after having a 21 day monitor.  pt having no complaints, just some weakness in the legs at times.       History of present illness:  Jeannette Diamond is a 88 y.o. yo female with history of palpitations. Her echo was relatively normal as was her 21 day event monitor. She says she is feeling better now. No chest pain.        History  Past Medical History:   Diagnosis Date   • Hypertension    • Pain of left heel    ,   Past Surgical History:   Procedure Laterality Date   • CARPAL TUNNEL RELEASE     • CHOLECYSTECTOMY     ,   Family History   Problem Relation Age of Onset   • No Known Problems Mother    • Stroke Father    • Ulcers Father    • No Known Problems Sister    • No Known Problems Brother    • No Known Problems Brother    • No Known Problems Brother    • No Known Problems Brother    ,   Social History   Substance Use Topics   • Smoking status: Former Smoker     Start date: 1948     Quit date: 1955   • Smokeless tobacco: Never Used      Comment: only smoked socially and did not inhale.   • Alcohol use No   ,     Medications  Current Outpatient Prescriptions   Medication Sig Dispense Refill   • aspirin 81 MG EC tablet Take 81 mg by mouth Daily.     • Fexofenadine-Pseudoephedrine (ALLEGRA-D 24 HOUR PO) Take 180 mg by mouth Daily.     • hydrochlorothiazide (HYDRODIURIL) 12.5 MG tablet Take 1 tablet by mouth Daily. 30 tablet 11   • lisinopril (PRINIVIL,ZESTRIL) 10 MG tablet Take 1 tablet by mouth Daily. 30 tablet 11   • metoprolol succinate XL (TOPROL-XL) 25 MG 24 hr tablet Take 1 tablet by mouth Daily. 30 tablet 11     No current facility-administered medications for this visit.        Allergies:  Patient has no known allergies.    Review of Systems  Review of Systems   HENT: Negative for  "nosebleeds.    Cardiovascular: Negative for chest pain, claudication, dyspnea on exertion, irregular heartbeat, leg swelling, near-syncope, orthopnea, palpitations, paroxysmal nocturnal dyspnea and syncope.   Respiratory: Negative for cough, hemoptysis and shortness of breath.    Gastrointestinal: Negative for dysphagia, hematemesis and melena.   Genitourinary: Negative for hematuria.   All other systems reviewed and are negative.      Objective     Physical Exam:  /60 (BP Location: Left arm, Patient Position: Sitting, Cuff Size: Adult)   Pulse 71   Ht 167.6 cm (66\")   Wt 61.2 kg (135 lb)   SpO2 93%   BMI 21.79 kg/m²   Physical Exam   Constitutional: She is oriented to person, place, and time. She appears well-developed and well-nourished. No distress.   HENT:   Head: Normocephalic and atraumatic.   Eyes: No scleral icterus.   Neck: Normal range of motion.   Cardiovascular: Normal rate, regular rhythm and normal heart sounds.  Exam reveals no gallop and no friction rub.    No murmur heard.  Pulmonary/Chest: Effort normal and breath sounds normal. No respiratory distress. She has no wheezes. She has no rales.   Abdominal: Soft. Bowel sounds are normal. She exhibits no distension. There is no tenderness.   Musculoskeletal: She exhibits no edema.   Neurological: She is alert and oriented to person, place, and time. No cranial nerve deficit.   Skin: Skin is warm and dry. She is not diaphoretic. No erythema.   Psychiatric: She has a normal mood and affect. Her behavior is normal.       Results Review:    ECG 12 Lead  Date/Time: 8/27/2018 9:21 AM  Performed by: CIERA WARNER  Authorized by: CIERA WARNER   Comparison: compared with previous ECG   Similar to previous ECG  Rhythm: sinus bradycardia  Rate: normal  Conduction: conduction normal  ST Segments: ST segments normal  T Waves: T waves normal  QRS axis: normal  Clinical impression: non-specific ECG  Comments: Poor R wave progression            Hospital " Outpatient Visit on 04/23/2018   Component Date Value Ref Range Status   • BSA 04/23/2018 1.7  m^2 Final   • IVSd 04/23/2018 1.0  cm Final   • LVIDd 04/23/2018 4.8  cm Final   • LVIDs 04/23/2018 3.0  cm Final   • LVPWd 04/23/2018 1.0  cm Final   • IVS/LVPW 04/23/2018 0.99   Final   • FS 04/23/2018 36.6  % Final   • EDV(Teich) 04/23/2018 105.4  ml Final   • ESV(Teich) 04/23/2018 35.6  ml Final   • EF(Teich) 04/23/2018 66.3  % Final   • EDV(cubed) 04/23/2018 107.9  ml Final   • ESV(cubed) 04/23/2018 27.5  ml Final   • EF(cubed) 04/23/2018 74.5  % Final   • LV mass(C)d 04/23/2018 176.0  grams Final   • LV mass(C)dI 04/23/2018 104.3  grams/m^2 Final   • SV(Teich) 04/23/2018 69.9  ml Final   • SI(Teich) 04/23/2018 41.4  ml/m^2 Final   • SV(cubed) 04/23/2018 80.3  ml Final   • SI(cubed) 04/23/2018 47.6  ml/m^2 Final   • Ao root diam 04/23/2018 3.3  cm Final   • Ao root area 04/23/2018 8.6  cm^2 Final   • LA dimension 04/23/2018 3.3  cm Final   • LA/Ao 04/23/2018 1.0   Final   • LVOT diam 04/23/2018 1.9  cm Final   • LVOT area 04/23/2018 2.8  cm^2 Final   • LVOT area(traced) 04/23/2018 2.8  cm^2 Final   • LVLd ap4 04/23/2018 7.9  cm Final   • EDV(MOD-sp4) 04/23/2018 84.7  ml Final   • LVLs ap4 04/23/2018 6.4  cm Final   • ESV(MOD-sp4) 04/23/2018 33.2  ml Final   • EF(MOD-sp4) 04/23/2018 60.8  % Final   • SV(MOD-sp4) 04/23/2018 51.5  ml Final   • SI(MOD-sp4) 04/23/2018 30.5  ml/m^2 Final   • Ao root area (BSA corrected) 04/23/2018 2.0   Final   • EF - Contrast (4Ch) 04/23/2018 60.8  ml/m^2 Final   • LV Marlow Vol (BSA corrected) 04/23/2018 50.2  ml/m^2 Final   • LV Sys Vol (BSA corrected) 04/23/2018 19.7  ml/m^2 Final   • MV E max bita 04/23/2018 83.4  cm/sec Final   • MV A max bita 04/23/2018 94.1  cm/sec Final   • MV E/A 04/23/2018 0.89   Final   • MV dec time 04/23/2018 0.32  sec Final   • Ao pk bita 04/23/2018 174.0  cm/sec Final   • Ao max PG 04/23/2018 12.1  mmHg Final   • Ao max PG (full) 04/23/2018 5.2  mmHg Final   • Ao  V2 mean 04/23/2018 129.0  cm/sec Final   • Ao mean PG 04/23/2018 7.0  mmHg Final   • Ao mean PG (full) 04/23/2018 4.0  mmHg Final   • Ao V2 VTI 04/23/2018 47.1  cm Final   • KT(I,A) 04/23/2018 1.9  cm^2 Final   • KT(I,D) 04/23/2018 1.9  cm^2 Final   • KT(V,A) 04/23/2018 2.1  cm^2 Final   • KT(V,D) 04/23/2018 2.1  cm^2 Final   • AI max oswaldo 04/23/2018 433.0  cm/sec Final   • AI max PG 04/23/2018 76.5  mmHg Final   • AI dec slope 04/23/2018 143.0  cm/sec^2 Final   • AI P1/2t 04/23/2018 886.9  msec Final   • LV V1 max PG 04/23/2018 6.9  mmHg Final   • LV V1 mean PG 04/23/2018 3.0  mmHg Final   • LV V1 max 04/23/2018 131.0  cm/sec Final   • LV V1 mean 04/23/2018 74.6  cm/sec Final   • LV V1 VTI 04/23/2018 31.0  cm Final   • SV(Ao) 04/23/2018 402.8  ml Final   • SI(Ao) 04/23/2018 238.8  ml/m^2 Final   • SV(LVOT) 04/23/2018 87.9  ml Final   • SI(LVOT) 04/23/2018 52.1  ml/m^2 Final   • TR max oswaldo 04/23/2018 272.0  cm/sec Final   • RVSP(TR) 04/23/2018 34.6  mmHg Final   • RAP systole 04/23/2018 5.0  mmHg Final   • BH CV ECHO BRII - BZI_BMI 04/23/2018 21.6  kilograms/m^2 Final   • BH CV ECHO BRII - BSA([place]) 04/23/2018 1.7  m^2 Final   • BH CV ECHO BRII - BZI_METRIC_WEIGHT 04/23/2018 60.8  kg Final   • BH CV ECHO BRII - BZI_METRIC_HEIGHT 04/23/2018 167.6  cm Final   • Target HR (85%) 04/23/2018 113  bpm Final   • Max. Pred. HR (100%) 04/23/2018 133  bpm Final   • LA Volume Index 04/23/2018 43.0  mL/m2 Final   • Lat Peak E' Oswaldo 04/23/2018 7.2  cm/sec Final   • Echo EF Estimated 04/23/2018 65  % Final       Assessment/Plan   Jeannette was seen today for follow-up and lv dysfunction.    Diagnoses and all orders for this visit:    Palpitations, resolved with relatively normal event monitor    Essential hypertension, borderline control. She will let me know if it starts running higher    Other orders  -     ECG 12 Lead        Patient's Body mass index is 21.79 kg/m². BMI is within normal parameters. No follow-up  required.

## 2019-04-10 RX ORDER — LISINOPRIL 10 MG/1
TABLET ORAL
Qty: 30 TABLET | Refills: 4 | Status: SHIPPED | OUTPATIENT
Start: 2019-04-10 | End: 2019-08-29 | Stop reason: SDUPTHER

## 2019-08-29 RX ORDER — LISINOPRIL 10 MG/1
TABLET ORAL
Qty: 30 TABLET | Refills: 3 | Status: SHIPPED | OUTPATIENT
Start: 2019-08-29 | End: 2020-01-07 | Stop reason: SDUPTHER

## 2019-11-25 ENCOUNTER — OFFICE VISIT (OUTPATIENT)
Dept: CARDIOLOGY | Facility: CLINIC | Age: 84
End: 2019-11-25

## 2019-11-25 VITALS
HEART RATE: 82 BPM | SYSTOLIC BLOOD PRESSURE: 150 MMHG | OXYGEN SATURATION: 98 % | DIASTOLIC BLOOD PRESSURE: 80 MMHG | BODY MASS INDEX: 21.21 KG/M2 | HEIGHT: 66 IN | WEIGHT: 132 LBS

## 2019-11-25 DIAGNOSIS — I10 ESSENTIAL HYPERTENSION: Primary | ICD-10-CM

## 2019-11-25 DIAGNOSIS — R00.2 PALPITATIONS: ICD-10-CM

## 2019-11-25 PROCEDURE — 93000 ELECTROCARDIOGRAM COMPLETE: CPT | Performed by: INTERNAL MEDICINE

## 2019-11-25 PROCEDURE — 99213 OFFICE O/P EST LOW 20 MIN: CPT | Performed by: INTERNAL MEDICINE

## 2019-11-25 NOTE — PROGRESS NOTES
"  Referring Provider: Niyah Dangelo APRN    Reason for Follow-up Visit: HTN    Subjective .   Chief Complaint:   Chief Complaint   Patient presents with   • Follow-up     yearly   • Hypertension     pt states she does not check on a regular basis.  its a little high today but she has been rushed this morning.  pt states she missed her bp medication yesterday.   • Palpitations     pt states he has not had any symptoms.  she does get out of breath when she is rushing.       History of present illness:  Jeannette Diamond is a 89 y.o. yo female with history of HTN in for routine follow up of palpitations. She had a \"spell\" this morning.        History  Past Medical History:   Diagnosis Date   • Hypertension    • Pain of left heel    ,   Past Surgical History:   Procedure Laterality Date   • CARPAL TUNNEL RELEASE     • CHOLECYSTECTOMY     ,   Family History   Problem Relation Age of Onset   • No Known Problems Mother    • Stroke Father    • Ulcers Father    • No Known Problems Sister    • No Known Problems Brother    • No Known Problems Brother    • No Known Problems Brother    • No Known Problems Brother    ,   Social History     Tobacco Use   • Smoking status: Former Smoker     Start date:      Last attempt to quit:      Years since quittin.9   • Smokeless tobacco: Never Used   • Tobacco comment: only smoked socially and did not inhale.   Substance Use Topics   • Alcohol use: No   • Drug use: No   ,     Medications  Current Outpatient Medications   Medication Sig Dispense Refill   • aspirin 81 MG EC tablet Take 81 mg by mouth Daily.     • Fexofenadine-Pseudoephedrine (ALLEGRA-D 24 HOUR PO) Take 180 mg by mouth Daily.     • hydrochlorothiazide (HYDRODIURIL) 12.5 MG tablet Take 1 tablet by mouth Daily. 30 tablet 11   • lisinopril (PRINIVIL,ZESTRIL) 10 MG tablet TAKE ONE TABLET BY MOUTH DAILY 30 tablet 3   • metoprolol succinate XL (TOPROL-XL) 25 MG 24 hr tablet Take 1 tablet by mouth Daily. 30 tablet 11 " "    No current facility-administered medications for this visit.        Allergies:  Patient has no known allergies.    Review of Systems  Review of Systems   HENT: Negative for nosebleeds.    Cardiovascular: Positive for palpitations. Negative for chest pain, claudication, dyspnea on exertion, irregular heartbeat, leg swelling, near-syncope, orthopnea, paroxysmal nocturnal dyspnea and syncope.   Respiratory: Negative for cough, hemoptysis and shortness of breath.    Gastrointestinal: Negative for dysphagia, hematemesis and melena.   Genitourinary: Negative for hematuria.   All other systems reviewed and are negative.      Objective     Physical Exam:  /80   Pulse 82   Ht 167.6 cm (66\")   Wt 59.9 kg (132 lb)   SpO2 98%   BMI 21.31 kg/m²   Physical Exam   Constitutional: She is oriented to person, place, and time. She appears well-developed and well-nourished. No distress.   HENT:   Head: Normocephalic and atraumatic.   Eyes: No scleral icterus.   Neck: Normal range of motion.   Cardiovascular: Normal rate, regular rhythm and normal heart sounds. Exam reveals no gallop and no friction rub.   No murmur heard.  Pulmonary/Chest: Effort normal and breath sounds normal. No respiratory distress. She has no wheezes. She has no rales.   Abdominal: Soft. Bowel sounds are normal. She exhibits no distension. There is no tenderness.   Musculoskeletal: She exhibits no edema.   Neurological: She is alert and oriented to person, place, and time. No cranial nerve deficit.   Skin: Skin is warm and dry. She is not diaphoretic. No erythema.   Psychiatric: She has a normal mood and affect. Her behavior is normal.       Results Review:    ECG 12 Lead  Date/Time: 11/25/2019 9:30 AM  Performed by: Stephane Oro MD  Authorized by: Stephane Oro MD   Comparison: compared with previous ECG   Similar to previous ECG  Rhythm: sinus rhythm  Rate: normal  Conduction: conduction normal  Q waves: V1, V2, V3 and V4    ST Segments: ST " segments normal  T Waves: T waves normal  QRS axis: normal    Clinical impression: non-specific ECG            Hospital Outpatient Visit on 04/23/2018   Component Date Value Ref Range Status   • BSA 04/23/2018 1.7  m^2 Final   • IVSd 04/23/2018 1.0  cm Final   • LVIDd 04/23/2018 4.8  cm Final   • LVIDs 04/23/2018 3.0  cm Final   • LVPWd 04/23/2018 1.0  cm Final   • IVS/LVPW 04/23/2018 0.99   Final   • FS 04/23/2018 36.6  % Final   • EDV(Teich) 04/23/2018 105.4  ml Final   • ESV(Teich) 04/23/2018 35.6  ml Final   • EF(Teich) 04/23/2018 66.3  % Final   • EDV(cubed) 04/23/2018 107.9  ml Final   • ESV(cubed) 04/23/2018 27.5  ml Final   • EF(cubed) 04/23/2018 74.5  % Final   • LV mass(C)d 04/23/2018 176.0  grams Final   • LV mass(C)dI 04/23/2018 104.3  grams/m^2 Final   • SV(Teich) 04/23/2018 69.9  ml Final   • SI(Teich) 04/23/2018 41.4  ml/m^2 Final   • SV(cubed) 04/23/2018 80.3  ml Final   • SI(cubed) 04/23/2018 47.6  ml/m^2 Final   • Ao root diam 04/23/2018 3.3  cm Final   • Ao root area 04/23/2018 8.6  cm^2 Final   • LA dimension 04/23/2018 3.3  cm Final   • LA/Ao 04/23/2018 1.0   Final   • LVOT diam 04/23/2018 1.9  cm Final   • LVOT area 04/23/2018 2.8  cm^2 Final   • LVOT area(traced) 04/23/2018 2.8  cm^2 Final   • LVLd ap4 04/23/2018 7.9  cm Final   • EDV(MOD-sp4) 04/23/2018 84.7  ml Final   • LVLs ap4 04/23/2018 6.4  cm Final   • ESV(MOD-sp4) 04/23/2018 33.2  ml Final   • EF(MOD-sp4) 04/23/2018 60.8  % Final   • SV(MOD-sp4) 04/23/2018 51.5  ml Final   • SI(MOD-sp4) 04/23/2018 30.5  ml/m^2 Final   • Ao root area (BSA corrected) 04/23/2018 2.0   Final   • EF - Contrast (4Ch) 04/23/2018 60.8  ml/m^2 Final   • LV Marlow Vol (BSA corrected) 04/23/2018 50.2  ml/m^2 Final   • LV Sys Vol (BSA corrected) 04/23/2018 19.7  ml/m^2 Final   • MV E max bita 04/23/2018 83.4  cm/sec Final   • MV A max bita 04/23/2018 94.1  cm/sec Final   • MV E/A 04/23/2018 0.89   Final   • MV dec time 04/23/2018 0.32  sec Final   • Ao pk bita  04/23/2018 174.0  cm/sec Final   • Ao max PG 04/23/2018 12.1  mmHg Final   • Ao max PG (full) 04/23/2018 5.2  mmHg Final   • Ao V2 mean 04/23/2018 129.0  cm/sec Final   • Ao mean PG 04/23/2018 7.0  mmHg Final   • Ao mean PG (full) 04/23/2018 4.0  mmHg Final   • Ao V2 VTI 04/23/2018 47.1  cm Final   • KT(I,A) 04/23/2018 1.9  cm^2 Final   • KT(I,D) 04/23/2018 1.9  cm^2 Final   • KT(V,A) 04/23/2018 2.1  cm^2 Final   • KT(V,D) 04/23/2018 2.1  cm^2 Final   • AI max oswaldo 04/23/2018 433.0  cm/sec Final   • AI max PG 04/23/2018 76.5  mmHg Final   • AI dec slope 04/23/2018 143.0  cm/sec^2 Final   • AI P1/2t 04/23/2018 886.9  msec Final   • LV V1 max PG 04/23/2018 6.9  mmHg Final   • LV V1 mean PG 04/23/2018 3.0  mmHg Final   • LV V1 max 04/23/2018 131.0  cm/sec Final   • LV V1 mean 04/23/2018 74.6  cm/sec Final   • LV V1 VTI 04/23/2018 31.0  cm Final   • SV(Ao) 04/23/2018 402.8  ml Final   • SI(Ao) 04/23/2018 238.8  ml/m^2 Final   • SV(LVOT) 04/23/2018 87.9  ml Final   • SI(LVOT) 04/23/2018 52.1  ml/m^2 Final   • TR max oswaldo 04/23/2018 272.0  cm/sec Final   • RVSP(TR) 04/23/2018 34.6  mmHg Final   • RAP systole 04/23/2018 5.0  mmHg Final   • BH CV ECHO BRII - BZI_BMI 04/23/2018 21.6  kilograms/m^2 Final   • BH CV ECHO BRII - BSA(HAYCOCK) 04/23/2018 1.7  m^2 Final   • BH CV ECHO BRII - BZI_METRIC_WEIGHT 04/23/2018 60.8  kg Final   • BH CV ECHO BRII - BZI_METRIC_HEIGHT 04/23/2018 167.6  cm Final   • Target HR (85%) 04/23/2018 113  bpm Final   • Max. Pred. HR (100%) 04/23/2018 133  bpm Final   • LA Volume Index 04/23/2018 43.0  mL/m2 Final   • Lat Peak E' Oswaldo 04/23/2018 7.2  cm/sec Final   • Echo EF Estimated 04/23/2018 65  % Final       Assessment/Plan   Jeannette was seen today for follow-up, hypertension and palpitations.    Diagnoses and all orders for this visit:    Essential hypertension, missed her dose yesterday    Palpitations, overall they are improved but she still has occasional spells. Her echo and event monitor were  relatively normal    Other orders  -     ECG 12 Lead        Patient's Body mass index is 21.31 kg/m². Patient's Body mass index is 21.31 kg/m². BMI is within normal parameters. No follow-up required..

## 2020-01-07 RX ORDER — LISINOPRIL 10 MG/1
TABLET ORAL
Qty: 30 TABLET | Refills: 3 | OUTPATIENT
Start: 2020-01-07

## 2020-01-07 RX ORDER — LISINOPRIL 10 MG/1
10 TABLET ORAL DAILY
Qty: 90 TABLET | Refills: 4 | Status: SHIPPED | OUTPATIENT
Start: 2020-01-07 | End: 2021-09-13 | Stop reason: SDUPTHER

## 2020-08-03 ENCOUNTER — OFFICE VISIT (OUTPATIENT)
Dept: CARDIOLOGY | Facility: CLINIC | Age: 85
End: 2020-08-03

## 2020-08-03 VITALS
WEIGHT: 129 LBS | BODY MASS INDEX: 20.73 KG/M2 | SYSTOLIC BLOOD PRESSURE: 160 MMHG | HEART RATE: 53 BPM | DIASTOLIC BLOOD PRESSURE: 70 MMHG | HEIGHT: 66 IN | OXYGEN SATURATION: 98 %

## 2020-08-03 DIAGNOSIS — R00.2 PALPITATIONS: ICD-10-CM

## 2020-08-03 DIAGNOSIS — I10 ESSENTIAL HYPERTENSION: Primary | ICD-10-CM

## 2020-08-03 PROCEDURE — 93000 ELECTROCARDIOGRAM COMPLETE: CPT | Performed by: INTERNAL MEDICINE

## 2020-08-03 PROCEDURE — 99214 OFFICE O/P EST MOD 30 MIN: CPT | Performed by: INTERNAL MEDICINE

## 2020-08-03 NOTE — PROGRESS NOTES
Referring Provider: Niyah Dangelo APRN    Reason for Follow-up Visit: palpitations    Subjective .   Chief Complaint:   Chief Complaint   Patient presents with   • Follow-up     8 month fu   • Palpitations     pt states she is doing well with no complaints of symptoms   • Hypertension     pt states bp is doing well.       History of present illness:  Jeannette Diamond is a 89 y.o. yo female with history of hypertension and palpitations in for routine follow up. Denies any chest pain. She states her palpitations are much improved.        History  Past Medical History:   Diagnosis Date   • Hypertension    • Pain of left heel    ,   Past Surgical History:   Procedure Laterality Date   • CARPAL TUNNEL RELEASE     • CHOLECYSTECTOMY     ,   Family History   Problem Relation Age of Onset   • No Known Problems Mother    • Stroke Father    • Ulcers Father    • No Known Problems Sister    • No Known Problems Brother    • No Known Problems Brother    • No Known Problems Brother    • No Known Problems Brother    ,   Social History     Tobacco Use   • Smoking status: Former Smoker     Start date:      Last attempt to quit:      Years since quittin.6   • Smokeless tobacco: Never Used   • Tobacco comment: only smoked socially and did not inhale.   Substance Use Topics   • Alcohol use: Never     Frequency: Never   • Drug use: Never   ,     Medications  Current Outpatient Medications   Medication Sig Dispense Refill   • aspirin 81 MG EC tablet Take 81 mg by mouth Daily.     • Fexofenadine-Pseudoephedrine (ALLEGRA-D 24 HOUR PO) Take 180 mg by mouth Daily.     • hydrochlorothiazide (HYDRODIURIL) 12.5 MG tablet Take 1 tablet by mouth Daily. 30 tablet 11   • lisinopril (PRINIVIL,ZESTRIL) 10 MG tablet Take 1 tablet by mouth Daily. 90 tablet 4   • metoprolol succinate XL (TOPROL-XL) 25 MG 24 hr tablet Take 1 tablet by mouth Daily. 30 tablet 11     No current facility-administered medications for this visit.        Allergies:  " Patient has no known allergies.    Review of Systems  Review of Systems   HENT: Negative for nosebleeds.    Cardiovascular: Positive for dyspnea on exertion. Negative for chest pain, claudication, irregular heartbeat, leg swelling, near-syncope, orthopnea, palpitations, paroxysmal nocturnal dyspnea and syncope.   Respiratory: Negative for cough, hemoptysis and shortness of breath.    Gastrointestinal: Negative for dysphagia, hematemesis and melena.   Genitourinary: Negative for hematuria.   All other systems reviewed and are negative.      Objective     Physical Exam:  /70   Pulse 53   Ht 167.6 cm (66\")   Wt 58.5 kg (129 lb)   SpO2 98%   BMI 20.82 kg/m²   Physical Exam   Constitutional: She is oriented to person, place, and time. She appears well-developed and well-nourished. No distress.   HENT:   Head: Normocephalic and atraumatic.   Eyes: No scleral icterus.   Neck: Normal range of motion.   Cardiovascular: Normal rate, regular rhythm and normal heart sounds. Exam reveals no gallop and no friction rub.   No murmur heard.  Pulmonary/Chest: Effort normal and breath sounds normal. No respiratory distress. She has no wheezes. She has no rales.   Abdominal: Soft. Bowel sounds are normal. She exhibits no distension. There is no tenderness.   Musculoskeletal: She exhibits no edema.   Neurological: She is alert and oriented to person, place, and time. No cranial nerve deficit.   Skin: Skin is warm and dry. She is not diaphoretic. No erythema.   Psychiatric: She has a normal mood and affect. Her behavior is normal.       Results Review:    ECG 12 Lead  Date/Time: 8/3/2020 9:22 AM  Performed by: Stephane Oro MD  Authorized by: Stephane Oro MD   Comparison: compared with previous ECG   Similar to previous ECG  Rhythm: sinus rhythm  Rate: normal  Conduction: conduction normal  Q waves: V2 and V1    ST Segments: ST segments normal  T Waves: T waves normal  QRS axis: normal  Other findings: poor R wave " [facility] Outpatient Visit on 04/23/2018   Component Date Value Ref Range Status   • BSA 04/23/2018 1.7  m^2 Final   • IVSd 04/23/2018 1.0  cm Final   • LVIDd 04/23/2018 4.8  cm Final   • LVIDs 04/23/2018 3.0  cm Final   • LVPWd 04/23/2018 1.0  cm Final   • IVS/LVPW 04/23/2018 0.99   Final   • FS 04/23/2018 36.6  % Final   • EDV(Teich) 04/23/2018 105.4  ml Final   • ESV(Teich) 04/23/2018 35.6  ml Final   • EF(Teich) 04/23/2018 66.3  % Final   • EDV(cubed) 04/23/2018 107.9  ml Final   • ESV(cubed) 04/23/2018 27.5  ml Final   • EF(cubed) 04/23/2018 74.5  % Final   • LV mass(C)d 04/23/2018 176.0  grams Final   • LV mass(C)dI 04/23/2018 104.3  grams/m^2 Final   • SV(Teich) 04/23/2018 69.9  ml Final   • SI(Teich) 04/23/2018 41.4  ml/m^2 Final   • SV(cubed) 04/23/2018 80.3  ml Final   • SI(cubed) 04/23/2018 47.6  ml/m^2 Final   • Ao root diam 04/23/2018 3.3  cm Final   • Ao root area 04/23/2018 8.6  cm^2 Final   • LA dimension 04/23/2018 3.3  cm Final   • LA/Ao 04/23/2018 1.0   Final   • LVOT diam 04/23/2018 1.9  cm Final   • LVOT area 04/23/2018 2.8  cm^2 Final   • LVOT area(traced) 04/23/2018 2.8  cm^2 Final   • LVLd ap4 04/23/2018 7.9  cm Final   • EDV(MOD-sp4) 04/23/2018 84.7  ml Final   • LVLs ap4 04/23/2018 6.4  cm Final   • ESV(MOD-sp4) 04/23/2018 33.2  ml Final   • EF(MOD-sp4) 04/23/2018 60.8  % Final   • SV(MOD-sp4) 04/23/2018 51.5  ml Final   • SI(MOD-sp4) 04/23/2018 30.5  ml/m^2 Final   • Ao root area (BSA corrected) 04/23/2018 2.0   Final   • EF - Contrast (4Ch) 04/23/2018 60.8  ml/m^2 Final   • LV Marlow Vol (BSA corrected) 04/23/2018 50.2  ml/m^2 Final   • LV Sys Vol (BSA corrected) 04/23/2018 19.7  ml/m^2 Final   • MV E max bita 04/23/2018 83.4  cm/sec Final   • MV A max bita 04/23/2018 94.1  cm/sec Final   • MV E/A 04/23/2018 0.89   Final   • MV dec time 04/23/2018 0.32  sec Final   • Ao pk bita 04/23/2018 174.0  cm/sec Final   • Ao max PG 04/23/2018 12.1  mmHg Final   • Ao max PG (full)  04/23/2018 5.2  mmHg Final   • Ao V2 mean 04/23/2018 129.0  cm/sec Final   • Ao mean PG 04/23/2018 7.0  mmHg Final   • Ao mean PG (full) 04/23/2018 4.0  mmHg Final   • Ao V2 VTI 04/23/2018 47.1  cm Final   • KT(I,A) 04/23/2018 1.9  cm^2 Final   • KT(I,D) 04/23/2018 1.9  cm^2 Final   • KT(V,A) 04/23/2018 2.1  cm^2 Final   • KT(V,D) 04/23/2018 2.1  cm^2 Final   • AI max oswaldo 04/23/2018 433.0  cm/sec Final   • AI max PG 04/23/2018 76.5  mmHg Final   • AI dec slope 04/23/2018 143.0  cm/sec^2 Final   • AI P1/2t 04/23/2018 886.9  msec Final   • LV V1 max PG 04/23/2018 6.9  mmHg Final   • LV V1 mean PG 04/23/2018 3.0  mmHg Final   • LV V1 max 04/23/2018 131.0  cm/sec Final   • LV V1 mean 04/23/2018 74.6  cm/sec Final   • LV V1 VTI 04/23/2018 31.0  cm Final   • SV(Ao) 04/23/2018 402.8  ml Final   • SI(Ao) 04/23/2018 238.8  ml/m^2 Final   • SV(LVOT) 04/23/2018 87.9  ml Final   • SI(LVOT) 04/23/2018 52.1  ml/m^2 Final   • TR max oswaldo 04/23/2018 272.0  cm/sec Final   • RVSP(TR) 04/23/2018 34.6  mmHg Final   • RAP systole 04/23/2018 5.0  mmHg Final   • BH CV ECHO BRII - BZI_BMI 04/23/2018 21.6  kilograms/m^2 Final   • BH CV ECHO BRII - BSA(HAYCOCK) 04/23/2018 1.7  m^2 Final   • BH CV ECHO BRII - BZI_METRIC_WEIGHT 04/23/2018 60.8  kg Final   • BH CV ECHO BRII - BZI_METRIC_HEIGHT 04/23/2018 167.6  cm Final   • Target HR (85%) 04/23/2018 113  bpm Final   • Max. Pred. HR (100%) 04/23/2018 133  bpm Final   • LA Volume Index 04/23/2018 43.0  mL/m2 Final   • Lat Peak E' Oswaldo 04/23/2018 7.2  cm/sec Final   • Echo EF Estimated 04/23/2018 65  % Final       Assessment/Plan   Jeannette was seen today for follow-up, palpitations and hypertension.    Diagnoses and all orders for this visit:    Essential hypertension, could have better control but she is 89 and not really interested in increasing her meds    Palpitations, sigificantly improved    Other orders  -     ECG 12 Lead

## 2021-09-13 ENCOUNTER — OFFICE VISIT (OUTPATIENT)
Dept: CARDIOLOGY | Facility: CLINIC | Age: 86
End: 2021-09-13

## 2021-09-13 VITALS
WEIGHT: 115 LBS | BODY MASS INDEX: 18.48 KG/M2 | OXYGEN SATURATION: 100 % | SYSTOLIC BLOOD PRESSURE: 120 MMHG | HEIGHT: 66 IN | DIASTOLIC BLOOD PRESSURE: 70 MMHG | HEART RATE: 52 BPM

## 2021-09-13 DIAGNOSIS — I10 ESSENTIAL HYPERTENSION: Primary | ICD-10-CM

## 2021-09-13 DIAGNOSIS — R00.2 PALPITATIONS: ICD-10-CM

## 2021-09-13 PROCEDURE — 93000 ELECTROCARDIOGRAM COMPLETE: CPT | Performed by: INTERNAL MEDICINE

## 2021-09-13 PROCEDURE — 99214 OFFICE O/P EST MOD 30 MIN: CPT | Performed by: INTERNAL MEDICINE

## 2021-09-13 RX ORDER — LISINOPRIL 10 MG/1
10 TABLET ORAL DAILY
Qty: 90 TABLET | Refills: 3 | Status: SHIPPED | OUTPATIENT
Start: 2021-09-13 | End: 2022-06-07

## 2021-09-13 RX ORDER — METOPROLOL SUCCINATE 25 MG/1
25 TABLET, EXTENDED RELEASE ORAL DAILY
Qty: 90 TABLET | Refills: 3 | Status: SHIPPED | OUTPATIENT
Start: 2021-09-13 | End: 2022-06-07

## 2021-09-13 NOTE — PROGRESS NOTES
Referring Provider: Niyah Dangelo APRN    Reason for Follow-up Visit: HTN/palpitations    Subjective .   Chief Complaint:   Chief Complaint   Patient presents with   • Follow-up     YEARLY   • Hypertension     pt states she feels like she is doing well but does have some swelling in her ankles at times.   • Palpitations     pt states she is not having any symptoms.       History of present illness:  Jeannette Diamond is a 89 y.o. yo female with history of hypertension and palpitations. She has no complaints.        History  Past Medical History:   Diagnosis Date   • Hypertension    • Pain of left heel    ,   Past Surgical History:   Procedure Laterality Date   • CARPAL TUNNEL RELEASE     • CHOLECYSTECTOMY     ,   Family History   Problem Relation Age of Onset   • No Known Problems Mother    • Stroke Father    • Ulcers Father    • No Known Problems Sister    • No Known Problems Brother    • No Known Problems Brother    • No Known Problems Brother    • No Known Problems Brother    ,   Social History     Tobacco Use   • Smoking status: Former Smoker     Start date:      Quit date:      Years since quittin.7   • Smokeless tobacco: Never Used   • Tobacco comment: only smoked socially and did not inhale.   Vaping Use   • Vaping Use: Never used   Substance Use Topics   • Alcohol use: Never   • Drug use: Never   ,     Medications  Current Outpatient Medications   Medication Sig Dispense Refill   • aspirin 81 MG EC tablet Take 81 mg by mouth Daily.     • Fexofenadine-Pseudoephedrine (ALLEGRA-D 24 HOUR PO) Take 180 mg by mouth Daily.     • lisinopril (PRINIVIL,ZESTRIL) 10 MG tablet Take 1 tablet by mouth Daily. 90 tablet 4   • metoprolol succinate XL (TOPROL-XL) 25 MG 24 hr tablet Take 1 tablet by mouth Daily. 30 tablet 11     No current facility-administered medications for this visit.       Allergies:  Patient has no known allergies.    Review of Systems  Review of Systems   HENT: Negative for nosebleeds.   "  Cardiovascular: Negative for chest pain, claudication, dyspnea on exertion, leg swelling, near-syncope, orthopnea, palpitations, paroxysmal nocturnal dyspnea and syncope.   Respiratory: Negative for hemoptysis and shortness of breath.    Gastrointestinal: Negative for melena.   Genitourinary: Negative for hematuria.       Objective     Physical Exam:  /70   Pulse 52   Ht 167.6 cm (66\")   Wt 52.2 kg (115 lb)   SpO2 100%   BMI 18.56 kg/m²   Pulmonary:      Effort: Pulmonary effort is normal.      Breath sounds: Normal breath sounds.   Cardiovascular:      Normal rate. Regular rhythm.      Murmurs: There is no murmur.   Edema:     Peripheral edema absent.         Results Review:    ECG 12 Lead    Date/Time: 9/13/2021 9:57 AM  Performed by: Stephane Oro MD  Authorized by: Stephane Oro MD   Comparison: compared with previous ECG   Similar to previous ECG  Rhythm: sinus rhythm  Rate: normal  Conduction: conduction normal  ST Segments: ST segments normal  T Waves: T waves normal  QRS axis: normal    Clinical impression: normal ECG            Hospital Outpatient Visit on 04/23/2018   Component Date Value Ref Range Status   • BSA 04/23/2018 1.7  m^2 Final   • IVSd 04/23/2018 1.0  cm Final   • LVIDd 04/23/2018 4.8  cm Final   • LVIDs 04/23/2018 3.0  cm Final   • LVPWd 04/23/2018 1.0  cm Final   • IVS/LVPW 04/23/2018 0.99   Final   • FS 04/23/2018 36.6  % Final   • EDV(Teich) 04/23/2018 105.4  ml Final   • ESV(Teich) 04/23/2018 35.6  ml Final   • EF(Teich) 04/23/2018 66.3  % Final   • EDV(cubed) 04/23/2018 107.9  ml Final   • ESV(cubed) 04/23/2018 27.5  ml Final   • EF(cubed) 04/23/2018 74.5  % Final   • LV mass(C)d 04/23/2018 176.0  grams Final   • LV mass(C)dI 04/23/2018 104.3  grams/m^2 Final   • SV(Teich) 04/23/2018 69.9  ml Final   • SI(Teich) 04/23/2018 41.4  ml/m^2 Final   • SV(cubed) 04/23/2018 80.3  ml Final   • SI(cubed) 04/23/2018 47.6  ml/m^2 Final   • Ao root diam 04/23/2018 3.3  cm Final   • Ao " root area 04/23/2018 8.6  cm^2 Final   • LA dimension 04/23/2018 3.3  cm Final   • LA/Ao 04/23/2018 1.0   Final   • LVOT diam 04/23/2018 1.9  cm Final   • LVOT area 04/23/2018 2.8  cm^2 Final   • LVOT area(traced) 04/23/2018 2.8  cm^2 Final   • LVLd ap4 04/23/2018 7.9  cm Final   • EDV(MOD-sp4) 04/23/2018 84.7  ml Final   • LVLs ap4 04/23/2018 6.4  cm Final   • ESV(MOD-sp4) 04/23/2018 33.2  ml Final   • EF(MOD-sp4) 04/23/2018 60.8  % Final   • SV(MOD-sp4) 04/23/2018 51.5  ml Final   • SI(MOD-sp4) 04/23/2018 30.5  ml/m^2 Final   • Ao root area (BSA corrected) 04/23/2018 2.0   Final   • EF - Contrast (4Ch) 04/23/2018 60.8  ml/m^2 Final   • LV Marlow Vol (BSA corrected) 04/23/2018 50.2  ml/m^2 Final   • LV Sys Vol (BSA corrected) 04/23/2018 19.7  ml/m^2 Final   • MV E max bita 04/23/2018 83.4  cm/sec Final   • MV A max bita 04/23/2018 94.1  cm/sec Final   • MV E/A 04/23/2018 0.89   Final   • MV dec time 04/23/2018 0.32  sec Final   • Ao pk bita 04/23/2018 174.0  cm/sec Final   • Ao max PG 04/23/2018 12.1  mmHg Final   • Ao max PG (full) 04/23/2018 5.2  mmHg Final   • Ao V2 mean 04/23/2018 129.0  cm/sec Final   • Ao mean PG 04/23/2018 7.0  mmHg Final   • Ao mean PG (full) 04/23/2018 4.0  mmHg Final   • Ao V2 VTI 04/23/2018 47.1  cm Final   • KT(I,A) 04/23/2018 1.9  cm^2 Final   • KT(I,D) 04/23/2018 1.9  cm^2 Final   • KT(V,A) 04/23/2018 2.1  cm^2 Final   • KT(V,D) 04/23/2018 2.1  cm^2 Final   • AI max bita 04/23/2018 433.0  cm/sec Final   • AI max PG 04/23/2018 76.5  mmHg Final   • AI dec slope 04/23/2018 143.0  cm/sec^2 Final   • AI P1/2t 04/23/2018 886.9  msec Final   • LV V1 max PG 04/23/2018 6.9  mmHg Final   • LV V1 mean PG 04/23/2018 3.0  mmHg Final   • LV V1 max 04/23/2018 131.0  cm/sec Final   • LV V1 mean 04/23/2018 74.6  cm/sec Final   • LV V1 VTI 04/23/2018 31.0  cm Final   • SV(Ao) 04/23/2018 402.8  ml Final   • SI(Ao) 04/23/2018 238.8  ml/m^2 Final   • SV(LVOT) 04/23/2018 87.9  ml Final   • SI(LVOT)  04/23/2018 52.1  ml/m^2 Final   • TR max oswaldo 04/23/2018 272.0  cm/sec Final   • RVSP(TR) 04/23/2018 34.6  mmHg Final   • RAP systole 04/23/2018 5.0  mmHg Final   • BH CV ECHO BRII - BZI_BMI 04/23/2018 21.6  kilograms/m^2 Final   • BH CV ECHO BRII - BSA(HAYCOCK) 04/23/2018 1.7  m^2 Final   • BH CV ECHO BRII - BZI_METRIC_WEIGHT 04/23/2018 60.8  kg Final   • BH CV ECHO BRII - BZI_METRIC_HEIGHT 04/23/2018 167.6  cm Final   • Target HR (85%) 04/23/2018 113  bpm Final   • Max. Pred. HR (100%) 04/23/2018 133  bpm Final   • LA Volume Index 04/23/2018 43.0  mL/m2 Final   • Lat Peak E' Oswaldo 04/23/2018 7.2  cm/sec Final   • Echo EF Estimated 04/23/2018 65  % Final       Assessment/Plan   Problem List Items Addressed This Visit        Cardiac and Vasculature    Palpitations    Current Assessment & Plan     Resolved with beta blocker         Essential hypertension - Primary    Current Assessment & Plan     Good control               Medical Complexity  must have 2 out of 3     Moderate Complexity Level 4           1 of the following medical problems:          []One chronic illness with mild exacerbation         [x]Two or more stable chronic illness          []One new problem  []One acute illness with systemic symptoms    Complexity of Data  Reviewed (1 out of the 3 following categories)      Category 1 tests, documents, historian (must have 3 points)       []Review of prior external records  [x]Review of results of unique tests  []Ordering unique tests  []Assessment requires an independent historian    Category 2 Interpretation of tests   []Independent interpretation of test read by another doc    Category 3 Discuss Management/tests  []Discussion with external physician    Risk of complications and/or morbidity          [x]Prescription Drug Management, no refills needed. Agree with current medical regimen  Had a long discussion on the need to be vaccinated for covid

## 2022-06-08 RX ORDER — METOPROLOL SUCCINATE 25 MG/1
TABLET, EXTENDED RELEASE ORAL
Qty: 90 TABLET | Refills: 3 | Status: SHIPPED | OUTPATIENT
Start: 2022-06-08 | End: 2022-10-17 | Stop reason: DRUGHIGH

## 2022-06-08 RX ORDER — LISINOPRIL 10 MG/1
TABLET ORAL
Qty: 90 TABLET | Refills: 3 | Status: SHIPPED | OUTPATIENT
Start: 2022-06-08 | End: 2022-10-17 | Stop reason: SDUPTHER

## 2022-10-17 ENCOUNTER — OFFICE VISIT (OUTPATIENT)
Dept: CARDIOLOGY | Facility: CLINIC | Age: 87
End: 2022-10-17

## 2022-10-17 VITALS
OXYGEN SATURATION: 98 % | SYSTOLIC BLOOD PRESSURE: 180 MMHG | WEIGHT: 114 LBS | BODY MASS INDEX: 18.32 KG/M2 | HEART RATE: 44 BPM | HEIGHT: 66 IN | DIASTOLIC BLOOD PRESSURE: 70 MMHG

## 2022-10-17 DIAGNOSIS — I10 ESSENTIAL HYPERTENSION: ICD-10-CM

## 2022-10-17 DIAGNOSIS — R00.2 PALPITATIONS: Primary | ICD-10-CM

## 2022-10-17 DIAGNOSIS — R00.1 BRADYCARDIA, SINUS: ICD-10-CM

## 2022-10-17 PROCEDURE — 93000 ELECTROCARDIOGRAM COMPLETE: CPT | Performed by: INTERNAL MEDICINE

## 2022-10-17 PROCEDURE — 99214 OFFICE O/P EST MOD 30 MIN: CPT | Performed by: INTERNAL MEDICINE

## 2022-10-17 RX ORDER — METOPROLOL SUCCINATE 50 MG/1
50 TABLET, EXTENDED RELEASE ORAL DAILY
Qty: 90 TABLET | Refills: 4 | Status: SHIPPED | OUTPATIENT
Start: 2022-10-17

## 2022-10-17 RX ORDER — LISINOPRIL 20 MG/1
20 TABLET ORAL DAILY
Qty: 90 TABLET | Refills: 3 | Status: SHIPPED | OUTPATIENT
Start: 2022-10-17

## 2022-10-17 RX ORDER — METOPROLOL SUCCINATE 50 MG/1
50 TABLET, EXTENDED RELEASE ORAL DAILY
COMMUNITY
Start: 2022-10-14 | End: 2022-10-17 | Stop reason: SDUPTHER

## 2022-10-17 RX ORDER — LISINOPRIL 10 MG/1
10 TABLET ORAL DAILY
Qty: 90 TABLET | Refills: 4 | Status: SHIPPED | OUTPATIENT
Start: 2022-10-17 | End: 2022-10-17

## 2022-10-17 NOTE — PROGRESS NOTES
Referring Provider: Niyah Dangelo APRN    Reason for Follow-up Visit: palpitations    Subjective .   Chief Complaint:   Chief Complaint   Patient presents with   • Hypertension     Yearly pt states she has not been checking at home but feels that is is fine and has not had any issues.   • Hyperlipidemia     Last lab done 2022 LDL was 48 on no medication.       History of present illness:  Jeannette Diamond is a 89 y.o. yo female with benign palpitations that have responded well to beat blocker. She says she is asymptomatic but she admits to an occasional fall       History  Past Medical History:   Diagnosis Date   • Hypertension    • Pain of left heel    ,   Past Surgical History:   Procedure Laterality Date   • CARPAL TUNNEL RELEASE     • CHOLECYSTECTOMY     ,   Family History   Problem Relation Age of Onset   • No Known Problems Mother    • Stroke Father    • Ulcers Father    • No Known Problems Sister    • No Known Problems Brother    • No Known Problems Brother    • No Known Problems Brother    • No Known Problems Brother    ,   Social History     Tobacco Use   • Smoking status: Former     Types: Cigarettes     Start date:      Quit date:      Years since quittin.8   • Smokeless tobacco: Never   • Tobacco comments:     only smoked socially and did not inhale.   Vaping Use   • Vaping Use: Never used   Substance Use Topics   • Alcohol use: Never   • Drug use: Never   ,     Medications  Current Outpatient Medications   Medication Sig Dispense Refill   • aspirin 81 MG EC tablet Take 81 mg by mouth Daily.     • Fexofenadine-Pseudoephedrine (ALLEGRA-D 24 HOUR PO) Take 180 mg by mouth Daily.     • metoprolol succinate XL (TOPROL-XL) 50 MG 24 hr tablet Take 1 tablet by mouth Daily. 90 tablet 4   • lisinopril (PRINIVIL,ZESTRIL) 20 MG tablet Take 1 tablet by mouth Daily. 90 tablet 3     No current facility-administered medications for this visit.       Allergies:  Patient has no known allergies.    Review of  "Systems  Review of Systems   HENT: Negative for nosebleeds.    Cardiovascular: Negative for chest pain, claudication, dyspnea on exertion, leg swelling, near-syncope, orthopnea, palpitations, paroxysmal nocturnal dyspnea and syncope.   Respiratory: Negative for hemoptysis and shortness of breath.    Gastrointestinal: Negative for melena.   Genitourinary: Negative for hematuria.   Neurological: Positive for disturbances in coordination and loss of balance.       Objective     Physical Exam:  /70   Pulse (!) 44   Ht 167.6 cm (66\")   Wt 51.7 kg (114 lb)   SpO2 98%   BMI 18.40 kg/m²   Pulmonary:      Effort: Pulmonary effort is normal.      Breath sounds: Normal breath sounds.   Cardiovascular:      Normal rate. Regular rhythm.      Murmurs: There is no murmur.   Edema:     Pretibial: bilateral trace edema of the pretibial area.     Ankle: bilateral trace edema of the ankle.        Results Review:    ECG 12 Lead    Date/Time: 10/17/2022 12:42 PM  Performed by: Stephane Oro MD  Authorized by: Stephane Oro MD   Comparison: compared with previous ECG   Similar to previous ECG  Rhythm: sinus bradycardia  Rate: bradycardic  Conduction: conduction normal  Q waves: V2 and V1    QRS axis: normal  Other findings: non-specific ST-T wave changes    Clinical impression: abnormal EKG            Hospital Outpatient Visit on 04/23/2018   Component Date Value Ref Range Status   • BSA 04/23/2018 1.7  m^2 Final   • IVSd 04/23/2018 1.0  cm Final   • LVIDd 04/23/2018 4.8  cm Final   • LVIDs 04/23/2018 3.0  cm Final   • LVPWd 04/23/2018 1.0  cm Final   • IVS/LVPW 04/23/2018 0.99   Final   • FS 04/23/2018 36.6  % Final   • EDV(Teich) 04/23/2018 105.4  ml Final   • ESV(Teich) 04/23/2018 35.6  ml Final   • EF(Teich) 04/23/2018 66.3  % Final   • EDV(cubed) 04/23/2018 107.9  ml Final   • ESV(cubed) 04/23/2018 27.5  ml Final   • EF(cubed) 04/23/2018 74.5  % Final   • LV mass(C)d 04/23/2018 176.0  grams Final   • LV mass(C)dI " 04/23/2018 104.3  grams/m^2 Final   • SV(Teich) 04/23/2018 69.9  ml Final   • SI(Teich) 04/23/2018 41.4  ml/m^2 Final   • SV(cubed) 04/23/2018 80.3  ml Final   • SI(cubed) 04/23/2018 47.6  ml/m^2 Final   • Ao root diam 04/23/2018 3.3  cm Final   • Ao root area 04/23/2018 8.6  cm^2 Final   • LA dimension (2D)  04/23/2018 3.3  cm Final   • LA/Ao 04/23/2018 1.0   Final   • LVOT diam 04/23/2018 1.9  cm Final   • LVOT area 04/23/2018 2.8  cm^2 Final   • LVOT area(traced) 04/23/2018 2.8  cm^2 Final   • LVLd ap4 04/23/2018 7.9  cm Final   • EDV(MOD-sp4) 04/23/2018 84.7  ml Final   • LVLs ap4 04/23/2018 6.4  cm Final   • ESV(MOD-sp4) 04/23/2018 33.2  ml Final   • EF(MOD-sp4) 04/23/2018 60.8  % Final   • SV(MOD-sp4) 04/23/2018 51.5  ml Final   • SI(MOD-sp4) 04/23/2018 30.5  ml/m^2 Final   • Ao root area (BSA corrected) 04/23/2018 2.0   Final   • EF - Contrast (4Ch) 04/23/2018 60.8  ml/m^2 Final   • LV Marlow Vol (BSA corrected) 04/23/2018 50.2  ml/m^2 Final   • LV Sys Vol (BSA corrected) 04/23/2018 19.7  ml/m^2 Final   • MV E max bita 04/23/2018 83.4  cm/sec Final   • MV A max bita 04/23/2018 94.1  cm/sec Final   • MV E/A 04/23/2018 0.89   Final   • MV dec time 04/23/2018 0.32  sec Final   • Ao pk bita 04/23/2018 174.0  cm/sec Final   • Ao max PG 04/23/2018 12.1  mmHg Final   • Ao max PG (full) 04/23/2018 5.2  mmHg Final   • Ao V2 mean 04/23/2018 129.0  cm/sec Final   • Ao mean PG 04/23/2018 7.0  mmHg Final   • Ao mean PG (full) 04/23/2018 4.0  mmHg Final   • Ao V2 VTI 04/23/2018 47.1  cm Final   • KT(I,A) 04/23/2018 1.9  cm^2 Final   • KT(I,D) 04/23/2018 1.9  cm^2 Final   • KT(V,A) 04/23/2018 2.1  cm^2 Final   • KT(V,D) 04/23/2018 2.1  cm^2 Final   • AI max bita 04/23/2018 433.0  cm/sec Final   • AI max PG 04/23/2018 76.5  mmHg Final   • AI dec slope 04/23/2018 143.0  cm/sec^2 Final   • AI P1/2t 04/23/2018 886.9  msec Final   • LV V1 max PG 04/23/2018 6.9  mmHg Final   • LV V1 mean PG 04/23/2018 3.0  mmHg Final   • LV V1 max  04/23/2018 131.0  cm/sec Final   • LV V1 mean 04/23/2018 74.6  cm/sec Final   • LV V1 VTI 04/23/2018 31.0  cm Final   • SV(Ao) 04/23/2018 402.8  ml Final   • SI(Ao) 04/23/2018 238.8  ml/m^2 Final   • SV(LVOT) 04/23/2018 87.9  ml Final   • SI(LVOT) 04/23/2018 52.1  ml/m^2 Final   • TR max oswaldo 04/23/2018 272.0  cm/sec Final   • RVSP(TR) 04/23/2018 34.6  mmHg Final   • RAP systole 04/23/2018 5.0  mmHg Final   • BH CV ECHO BRII - BZI_BMI 04/23/2018 21.6  kilograms/m^2 Final   •  CV ECHO BRII - BSA(HAYCOCK) 04/23/2018 1.7  m^2 Final   •  CV ECHO BRII - BZI_METRIC_WEIGHT 04/23/2018 60.8  kg Final   •  CV ECHO BRII - BZI_METRIC_HEIGHT 04/23/2018 167.6  cm Final   • Target HR (85%) 04/23/2018 113  bpm Final   • Max. Pred. HR (100%) 04/23/2018 133  bpm Final   • LA ESV Index (BP) 04/23/2018 43.0  mL/m2 Final   • Lat Peak E' Oswaldo 04/23/2018 7.2  cm/sec Final   • Echo EF Estimated 04/23/2018 65  % Final       Assessment & Plan   Problem List Items Addressed This Visit        Cardiac and Vasculature    Palpitations    Current Assessment & Plan     Resolved with beta blockers         Essential hypertension    Current Assessment & Plan     Running high today. Will increase her lisinopril to 20 mg daily         Relevant Medications    metoprolol succinate XL (TOPROL-XL) 50 MG 24 hr tablet    lisinopril (PRINIVIL,ZESTRIL) 20 MG tablet    Bradycardia, sinus - Primary    Current Assessment & Plan     Probably due to her beta blocker but she has had some falls. Will get a 7 day event monitor         Relevant Medications    metoprolol succinate XL (TOPROL-XL) 50 MG 24 hr tablet    Other Relevant Orders    Holter Monitor - 72 Hour Up To 15 Days       Medical Complexity  must have 2 out of 3     Moderate Complexity Level 4           1 of the following medical problems:          []One chronic illness with mild exacerbation         [x]Two or more stable chronic illness          [x]One new problem  []One acute illness with systemic  symptoms    Complexity of Data  Reviewed (1 out of the 3 following categories)      Category 1 tests, documents, historian (must have 3 points)       []Review of prior external records  [x]Review of results of unique tests  [x]Ordering unique tests  []Assessment requires an independent historian    Category 2 Interpretation of tests   []Independent interpretation of test read by another doc    Category 3 Discuss Management/tests  []Discussion with external physician    Risk of complications and/or morbidity          [x]Prescription Drug Management

## 2022-10-18 ENCOUNTER — TELEPHONE (OUTPATIENT)
Dept: CARDIOLOGY | Facility: CLINIC | Age: 87
End: 2022-10-18

## 2022-10-18 NOTE — TELEPHONE ENCOUNTER
Caller: Jeannette Diamond    Relationship: Self    Best call back number: [phone]    What is the best time to reach you: ANY     Who are you requesting to speak with (clinical staff, provider,  specific staff member): ANY       What was the call regarding: PT WAS HAVING A MONITOR SENT TO HER HOUSE BUT IT NEVER ARRIVED. SHE THINKS THAT THE MONITOR MIGHT HAVE ACCIDENTALLY BEEN SENT TO THE OFFICE. PLEASE CALL PT AND DISCUSS THE MONITOR.    Do you require a callback: YES

## 2022-10-18 NOTE — TELEPHONE ENCOUNTER
I called pt and explained that it was just registered yesterday and it takes a couple of days for her to get it from [employer].  It will come FedEx so she will need to watch for it on her front door.  I also told her if she did not feel comfortable placing it on herself she can bring it here to have one of us place it on her and I gave her the time frame that we put monitors on.  She stated understanding.  Herberth Ortega, CMA

## 2022-11-28 ENCOUNTER — TELEPHONE (OUTPATIENT)
Dept: CARDIOLOGY | Facility: CLINIC | Age: 87
End: 2022-11-28

## 2022-11-28 NOTE — TELEPHONE ENCOUNTER
Caller: Jeannette Lopez FRANCISCO    Relationship: Self    Best call back number: [phone]    What is the best time to reach you: IN THE MORNING    Who are you requesting to speak with (clinical staff, provider,  specific staff member): CLINICAL STAFF    Do you know the name of the person who called: JEANNETTE LOPEZ    What was the call regarding: PT STATES THAT SHE RECEIVED A CALL FROM SOMEONE STATING THAT HER PREVIOUS MONITOR DID NOT READ PROPERLY. THEY TOLD HER THAT SHE NEEDS TO GET A NEW MONITOR PUT ON. SHE WOULD LIKE SOME CLARIFICATION ON THIS AND INFORMATION ON NEXT STEPS.    Do you require a callback: YES

## 2023-07-31 RX ORDER — LISINOPRIL 20 MG/1
TABLET ORAL
Qty: 90 TABLET | Refills: 3 | Status: SHIPPED | OUTPATIENT
Start: 2023-07-31

## 2023-07-31 RX ORDER — METOPROLOL SUCCINATE 50 MG/1
TABLET, EXTENDED RELEASE ORAL
Qty: 90 TABLET | Refills: 3 | Status: SHIPPED | OUTPATIENT
Start: 2023-07-31

## 2023-12-04 ENCOUNTER — OFFICE VISIT (OUTPATIENT)
Dept: CARDIOLOGY | Facility: CLINIC | Age: 88
End: 2023-12-04
Payer: MEDICARE

## 2023-12-04 VITALS
HEIGHT: 66 IN | HEART RATE: 57 BPM | OXYGEN SATURATION: 94 % | WEIGHT: 103 LBS | SYSTOLIC BLOOD PRESSURE: 100 MMHG | BODY MASS INDEX: 16.55 KG/M2 | DIASTOLIC BLOOD PRESSURE: 60 MMHG

## 2023-12-04 DIAGNOSIS — R00.1 BRADYCARDIA, SINUS: ICD-10-CM

## 2023-12-04 DIAGNOSIS — I10 ESSENTIAL HYPERTENSION: ICD-10-CM

## 2023-12-04 DIAGNOSIS — R00.2 PALPITATIONS: Primary | ICD-10-CM

## 2023-12-04 PROCEDURE — 1159F MED LIST DOCD IN RCRD: CPT | Performed by: INTERNAL MEDICINE

## 2023-12-04 PROCEDURE — 93000 ELECTROCARDIOGRAM COMPLETE: CPT | Performed by: INTERNAL MEDICINE

## 2023-12-04 PROCEDURE — 99213 OFFICE O/P EST LOW 20 MIN: CPT | Performed by: INTERNAL MEDICINE

## 2023-12-04 PROCEDURE — 1160F RVW MEDS BY RX/DR IN RCRD: CPT | Performed by: INTERNAL MEDICINE

## 2023-12-04 RX ORDER — LISINOPRIL 20 MG/1
20 TABLET ORAL DAILY
Qty: 90 TABLET | Refills: 3 | Status: SHIPPED | OUTPATIENT
Start: 2023-12-04

## 2023-12-04 RX ORDER — OMEPRAZOLE 20 MG/1
20 CAPSULE, DELAYED RELEASE ORAL DAILY
COMMUNITY
Start: 2023-11-17

## 2023-12-04 RX ORDER — METOPROLOL SUCCINATE 50 MG/1
50 TABLET, EXTENDED RELEASE ORAL DAILY
Qty: 90 TABLET | Refills: 3 | Status: SHIPPED | OUTPATIENT
Start: 2023-12-04

## 2023-12-04 NOTE — PROGRESS NOTES
Referring Provider: Niyah Dangelo APRN    Reason for Follow-up Visit: bradycardia    Subjective .   Chief Complaint:   Chief Complaint   Patient presents with    Palpitations     Yearly   pt wore a monitor that did not have any data on it last year.  She did not return my call to get it replaced.  Pt states she has had no issues with palpitations and as long as it keeps beating she is having no problems.    Hypertension     Pt states this is good.       History of present illness:  Jeannette Diamond is a 89 y.o. yo female with asymptomatic bradycardia and hypotension in routine follow up. She has no complaints at present       History  Past Medical History:   Diagnosis Date    Hypertension     Pain of left heel    ,   Past Surgical History:   Procedure Laterality Date    CARPAL TUNNEL RELEASE      CHOLECYSTECTOMY     ,   Family History   Problem Relation Age of Onset    No Known Problems Mother     Stroke Father     Ulcers Father     No Known Problems Sister     No Known Problems Brother     No Known Problems Brother     No Known Problems Brother     No Known Problems Brother    ,   Social History     Tobacco Use    Smoking status: Former     Types: Cigarettes     Start date:      Quit date:      Years since quittin.9    Smokeless tobacco: Never    Tobacco comments:     only smoked socially and did not inhale.   Vaping Use    Vaping Use: Never used   Substance Use Topics    Alcohol use: Never    Drug use: Never   ,     Medications  Current Outpatient Medications   Medication Sig Dispense Refill    aspirin 81 MG EC tablet Take 1 tablet by mouth Daily.      Fexofenadine-Pseudoephedrine (ALLEGRA-D 24 HOUR PO) Take 180 mg by mouth Daily.      lisinopril (PRINIVIL,ZESTRIL) 20 MG tablet Take 1 tablet by mouth once daily 90 tablet 3    metoprolol succinate XL (TOPROL-XL) 50 MG 24 hr tablet Take 1 tablet by mouth once daily 90 tablet 3    omeprazole (priLOSEC) 20 MG capsule Take 1 capsule by mouth Daily.    "    No current facility-administered medications for this visit.       Allergies:  Patient has no known allergies.    Review of Systems  Review of Systems   HENT:  Negative for nosebleeds.    Cardiovascular:  Negative for chest pain, claudication, dyspnea on exertion, leg swelling, near-syncope, orthopnea, palpitations, paroxysmal nocturnal dyspnea and syncope.   Respiratory:  Negative for hemoptysis and shortness of breath.    Gastrointestinal:  Negative for melena.   Genitourinary:  Negative for hematuria.       Objective     Physical Exam:  /60   Pulse 57   Ht 167.6 cm (66\")   Wt 46.7 kg (103 lb)   SpO2 94%   BMI 16.62 kg/m²   Pulmonary:      Effort: Pulmonary effort is normal.      Breath sounds: Normal breath sounds.   Cardiovascular:      Normal rate. Regular rhythm.      Murmurs: There is a harsh midsystolic murmur at the URSB.   Edema:     Peripheral edema absent.         Results Review:    ECG 12 Lead    Date/Time: 12/4/2023 11:54 AM  Performed by: Stephane Oro MD    Authorized by: Stephane Oro MD  Comparison: compared with previous ECG   Similar to previous ECG  Rhythm: sinus bradycardia  Ectopy: atrial premature contractions  Rate: bradycardic  Conduction: conduction normal  ST Segments: ST segments normal  T Waves: T waves normal  QRS axis: normal    Clinical impression: non-specific ECG          Hospital Outpatient Visit on 04/23/2018   Component Date Value Ref Range Status    BSA 04/23/2018 1.7  m^2 Final    IVSd 04/23/2018 1.0  cm Final    LVIDd 04/23/2018 4.8  cm Final    LVIDs 04/23/2018 3.0  cm Final    LVPWd 04/23/2018 1.0  cm Final    IVS/LVPW 04/23/2018 0.99   Final    FS 04/23/2018 36.6  % Final    EDV(Teich) 04/23/2018 105.4  ml Final    ESV(Teich) 04/23/2018 35.6  ml Final    EF(Teich) 04/23/2018 66.3  % Final    EDV(cubed) 04/23/2018 107.9  ml Final    ESV(cubed) 04/23/2018 27.5  ml Final    EF(cubed) 04/23/2018 74.5  % Final    LV mass(C)d 04/23/2018 176.0  grams Final    LV " mass(C)dI 04/23/2018 104.3  grams/m^2 Final    SV(Teich) 04/23/2018 69.9  ml Final    SI(Teich) 04/23/2018 41.4  ml/m^2 Final    SV(cubed) 04/23/2018 80.3  ml Final    SI(cubed) 04/23/2018 47.6  ml/m^2 Final    Ao root diam 04/23/2018 3.3  cm Final    Ao root area 04/23/2018 8.6  cm^2 Final    LA dimension (2D)  04/23/2018 3.3  cm Final    LA/Ao 04/23/2018 1.0   Final    LVOT diam 04/23/2018 1.9  cm Final    LVOT area 04/23/2018 2.8  cm^2 Final    LVOT area(traced) 04/23/2018 2.8  cm^2 Final    LVLd ap4 04/23/2018 7.9  cm Final    EDV(MOD-sp4) 04/23/2018 84.7  ml Final    LVLs ap4 04/23/2018 6.4  cm Final    ESV(MOD-sp4) 04/23/2018 33.2  ml Final    EF(MOD-sp4) 04/23/2018 60.8  % Final    SV(MOD-sp4) 04/23/2018 51.5  ml Final    SI(MOD-sp4) 04/23/2018 30.5  ml/m^2 Final    Ao root area (BSA corrected) 04/23/2018 2.0   Final    EF - Contrast (4Ch) 04/23/2018 60.8  ml/m^2 Final    LV Marlow Vol (BSA corrected) 04/23/2018 50.2  ml/m^2 Final    LV Sys Vol (BSA corrected) 04/23/2018 19.7  ml/m^2 Final    MV E max bita 04/23/2018 83.4  cm/sec Final    MV A max bita 04/23/2018 94.1  cm/sec Final    MV E/A 04/23/2018 0.89   Final    MV dec time 04/23/2018 0.32  sec Final    Ao pk bita 04/23/2018 174.0  cm/sec Final    Ao max PG 04/23/2018 12.1  mmHg Final    Ao max PG (full) 04/23/2018 5.2  mmHg Final    Ao V2 mean 04/23/2018 129.0  cm/sec Final    Ao mean PG 04/23/2018 7.0  mmHg Final    Ao mean PG (full) 04/23/2018 4.0  mmHg Final    Ao V2 VTI 04/23/2018 47.1  cm Final    KT(I,A) 04/23/2018 1.9  cm^2 Final    KT(I,D) 04/23/2018 1.9  cm^2 Final    KT(V,A) 04/23/2018 2.1  cm^2 Final    KT(V,D) 04/23/2018 2.1  cm^2 Final    AI max bita 04/23/2018 433.0  cm/sec Final    AI max PG 04/23/2018 76.5  mmHg Final    AI dec slope 04/23/2018 143.0  cm/sec^2 Final    AI P1/2t 04/23/2018 886.9  msec Final    LV V1 max PG 04/23/2018 6.9  mmHg Final    LV V1 mean PG 04/23/2018 3.0  mmHg Final    LV V1 max 04/23/2018 131.0  cm/sec Final     LV V1 mean 04/23/2018 74.6  cm/sec Final    LV V1 VTI 04/23/2018 31.0  cm Final    SV(Ao) 04/23/2018 402.8  ml Final    SI(Ao) 04/23/2018 238.8  ml/m^2 Final    SV(LVOT) 04/23/2018 87.9  ml Final    SI(LVOT) 04/23/2018 52.1  ml/m^2 Final    TR max oswaldo 04/23/2018 272.0  cm/sec Final    RVSP(TR) 04/23/2018 34.6  mmHg Final    RAP systole 04/23/2018 5.0  mmHg Final     CV ECHO BRII - BZI_BMI 04/23/2018 21.6  kilograms/m^2 Final     CV ECHO BRII - BSA(HAYCOCK) 04/23/2018 1.7  m^2 Final     CV ECHO BRII - BZI_METRIC_WEIGHT 04/23/2018 60.8  kg Final     CV ECHO BRII - BZI_METRIC_HEIGHT 04/23/2018 167.6  cm Final    Target HR (85%) 04/23/2018 113  bpm Final    Max. Pred. HR (100%) 04/23/2018 133  bpm Final    LA ESV Index (BP) 04/23/2018 43.0  mL/m2 Final    Lat Peak E' Oswaldo 04/23/2018 7.2  cm/sec Final    Echo EF Estimated 04/23/2018 65  % Final       Assessment & Plan   Problem List Items Addressed This Visit          Cardiac and Vasculature    Palpitations - Primary    Current Assessment & Plan     resolved         Essential hypertension    Current Assessment & Plan     Actually running low now         Bradycardia, sinus    Current Assessment & Plan     asymptomatic            Medical Complexity  must have 1 out of 3     Moderate Complexity Level 3           1 of the following medical problems:          []One chronic illness with mild exacerbation         [x]Two or more stable chronic illness          []One new problem  []One acute illness with systemic symptoms    Complexity of Data  Reviewed (1 out of the 3 following categories)      Category 1 tests, documents, historian (must have 3 points)       []Review of prior external records  []Review of results of unique tests  [x]Ordering unique tests  []Assessment requires an independent historian    Category 2 Interpretation of tests   []Independent interpretation of test read by another doc    Category 3 Discuss Management/tests  []Discussion with external  physician    Risk of complications and/or morbidity          [x]Prescription Drug Management